# Patient Record
Sex: FEMALE | Race: WHITE | NOT HISPANIC OR LATINO | URBAN - METROPOLITAN AREA
[De-identification: names, ages, dates, MRNs, and addresses within clinical notes are randomized per-mention and may not be internally consistent; named-entity substitution may affect disease eponyms.]

---

## 2020-07-25 ENCOUNTER — TELEPHONE ENCOUNTER (OUTPATIENT)
Dept: URBAN - METROPOLITAN AREA CLINIC 13 | Facility: CLINIC | Age: 50
End: 2020-07-25

## 2020-07-26 ENCOUNTER — TELEPHONE ENCOUNTER (OUTPATIENT)
Dept: URBAN - METROPOLITAN AREA CLINIC 13 | Facility: CLINIC | Age: 50
End: 2020-07-26

## 2021-05-19 ENCOUNTER — OFFICE VISIT (OUTPATIENT)
Dept: URBAN - METROPOLITAN AREA CLINIC 107 | Facility: CLINIC | Age: 51
End: 2021-05-19
Payer: MEDICARE

## 2021-05-19 ENCOUNTER — LAB OUTSIDE AN ENCOUNTER (OUTPATIENT)
Dept: URBAN - METROPOLITAN AREA CLINIC 107 | Facility: CLINIC | Age: 51
End: 2021-05-19

## 2021-05-19 ENCOUNTER — OFFICE VISIT (OUTPATIENT)
Dept: URBAN - METROPOLITAN AREA CLINIC 107 | Facility: CLINIC | Age: 51
End: 2021-05-19

## 2021-05-19 ENCOUNTER — WEB ENCOUNTER (OUTPATIENT)
Dept: URBAN - METROPOLITAN AREA CLINIC 107 | Facility: CLINIC | Age: 51
End: 2021-05-19

## 2021-05-19 VITALS
WEIGHT: 167 LBS | HEIGHT: 62 IN | RESPIRATION RATE: 18 BRPM | SYSTOLIC BLOOD PRESSURE: 163 MMHG | TEMPERATURE: 98.6 F | BODY MASS INDEX: 30.73 KG/M2 | HEART RATE: 66 BPM | DIASTOLIC BLOOD PRESSURE: 81 MMHG

## 2021-05-19 DIAGNOSIS — R74.8 ELEVATED LIVER ENZYMES: ICD-10-CM

## 2021-05-19 DIAGNOSIS — K21.9 GERD: ICD-10-CM

## 2021-05-19 DIAGNOSIS — R13.14 PHARYNGOESOPHAGEAL DYSPHAGIA: ICD-10-CM

## 2021-05-19 DIAGNOSIS — R11.2 NAUSEA WITH VOMITING, UNSPECIFIED: ICD-10-CM

## 2021-05-19 DIAGNOSIS — R10.13 EPIGASTRIC ABDOMINAL PAIN: ICD-10-CM

## 2021-05-19 PROCEDURE — 99204 OFFICE O/P NEW MOD 45 MIN: CPT | Performed by: INTERNAL MEDICINE

## 2021-05-19 RX ORDER — FAMOTIDINE 20 MG/1
1 TABLET AT BEDTIME AS NEEDED TABLET, FILM COATED ORAL ONCE A DAY
Status: ACTIVE | COMMUNITY

## 2021-05-19 RX ORDER — DOXYCYCLINE HYCLATE 100 MG/1
1 CAPSULE CAPSULE ORAL ONCE A DAY
Status: ACTIVE | COMMUNITY

## 2021-05-19 RX ORDER — HYDROCHLOROTHIAZIDE 12.5 MG/1
1 TABLET IN THE MORNING TABLET ORAL ONCE A DAY
Status: ACTIVE | COMMUNITY

## 2021-05-19 RX ORDER — CITALOPRAM HYDROBROMIDE 40 MG/1
0.5 TABLET TABLET, FILM COATED ORAL ONCE A DAY
Status: ACTIVE | COMMUNITY

## 2021-05-19 RX ORDER — MULTIVIT-MIN/FA/LYCOPEN/LUTEIN .4-300-25
AS DIRECTED TABLET ORAL
Status: ACTIVE | COMMUNITY

## 2021-05-19 RX ORDER — GLUCAGON INJECTION, SOLUTION 0.5 MG/.1ML
AS DIRECTED INJECTION, SOLUTION SUBCUTANEOUS
Status: ACTIVE | COMMUNITY

## 2021-05-19 RX ORDER — NEBIVOLOL HYDROCHLORIDE 5 MG/1
1 TABLET TABLET ORAL ONCE A DAY
Status: ACTIVE | COMMUNITY

## 2021-05-19 RX ORDER — IRBESARTAN AND HYDROCHLOROTHIAZIDE 300; 12.5 MG/1; MG/1
1 TABLET TABLET ORAL ONCE A DAY
Status: ACTIVE | COMMUNITY

## 2021-05-19 RX ORDER — OMEPRAZOLE 40 MG/1
1 CAPSULE TWICE DAILY, PRIOR TO BREAKFAST AND DINNER CAPSULE, DELAYED RELEASE ORAL TWICE DAILY
Qty: 180 | Refills: 3 | OUTPATIENT
Start: 2021-05-19

## 2021-05-19 RX ORDER — PROMETHAZINE HYDROCHLORIDE 12.5 MG/1
1 TABLET AS NEEDED FOR NAUSEA TABLET ORAL
Qty: 40 TABLET | Refills: 1 | OUTPATIENT
Start: 2021-05-19 | End: 2021-06-08

## 2021-05-19 RX ORDER — ONDANSETRON HYDROCHLORIDE 4 MG/1
1 TABLET TABLET, FILM COATED ORAL ONCE A DAY
Status: ACTIVE | COMMUNITY

## 2021-05-19 RX ORDER — LINACLOTIDE 290 UG/1
1 CAPSULE AT LEAST 30 MINUTES BEFORE THE FIRST MEAL OF THE DAY ON AN EMPTY STOMACH CAPSULE, GELATIN COATED ORAL ONCE A DAY
Status: ACTIVE | COMMUNITY

## 2021-05-19 RX ORDER — ROSUVASTATIN CALCIUM 5 MG/1
1 TABLET TABLET, FILM COATED ORAL ONCE A DAY
Status: ACTIVE | COMMUNITY

## 2021-05-19 RX ORDER — FENOFIBRATE 200 MG/1
1 CAPSULE WITH A MEAL CAPSULE ORAL ONCE A DAY
Status: ACTIVE | COMMUNITY

## 2021-05-19 RX ORDER — OMEPRAZOLE 40 MG/1
1 CAPSULE 30 MINUTES BEFORE MORNING MEAL CAPSULE, DELAYED RELEASE ORAL ONCE A DAY
Status: ACTIVE | COMMUNITY

## 2021-05-19 RX ORDER — PANCRELIPASE 36000; 180000; 114000 [USP'U]/1; [USP'U]/1; [USP'U]/1
AS DIRECTED CAPSULE, DELAYED RELEASE PELLETS ORAL
Status: ACTIVE | COMMUNITY

## 2021-05-19 RX ORDER — DICYCLOMINE HYDROCHLORIDE 20 MG/1
1 TABLET TABLET ORAL THREE TIMES A DAY
Status: ACTIVE | COMMUNITY

## 2021-05-19 NOTE — HPI-TODAY'S VISIT:
50-year-old female with an extensive medical history to include febrile pancreatitis with SIRS and questionable pancreas divisum s/p total pancreatectomy, splenectomy, and cholecystectomy (2009, Dr. Niko Martin at [facility]), and multiple abdominal hernia repairs, referred by Jonathan Kiff, PA-C for evaluation of abdominal pain.  A copy of this document is being forwarded to the referring provider. This is a second opinion. She has a complicated medical history, and unfortunately is a fairly poor historian. Per a consult note from Dr. Bess in 2009, she was hospitalized at [facility] for abdominal pain related to acute, recurrent pancreatitis suspected to be related to familial hyperlipidemia. Her hospital course was complicated by respiratory distress syndrome requiring mechanical ventilation; prior ERCP findings raised concern for possible pancreas divisum. Due to the need for possible surgical intervention, she was transferred to [facility].  Per the patient, she was hospitalized at [facility] for 7 months, undergoing numerous surgeries. Initially, the head of the pancreas was removed, but symptoms persisted. This led to a splenectomy, and ultimately a total pancreatectomy. 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 Her initial surgeries were performed by Dr. Martin at [facility]; she has since followed with Dr. Buckner and undergone multiple hernia repairs. She was most recently seen at [facility] in February of this year, but when she complained of abdominal pain she was only offered narcotic medications. The patient declined, and has elected to no longer follow with the surgery team at [facility]. She actually mentions that she has an  involved due to prior post-operative care and subsequent surgical complications which were overlooked/dismissed.  Within the last three weeks, she has experienced intermittent exacerbations of sharp, aching mid to upper abdominal pain with nausea and vomiting up to 4x daily. The symptoms are worsened after meals, and respond to Zofran or Phenergan when needed. She denies reflux symptoms on omeprazole and famotidine. She reprts multiple CT scans and abdominal xrays within the last month at [facility], which reportedly showed constipation. She was started on Linzess, without improvement in her symptoms. She has three bowel movements per day at baseline, with an increase to 6 stools daily on Linzess. She denies blood per rectum. Her weight fluctuates, but she has not experienced any unintentional weight loss. She was recently evaluated by surgery, Dr. Castro, in [place]. Her abdominal pain was attributed to a hernia, but he did not recommend surgical intervention. She had an iron infusion this morning, arranged by her PCP. She avoids NSAIDs. Her daughter was present at the close of the interview to discuss the plan of care.

## 2021-05-19 NOTE — HPI-OTHER HISTORIES
Labs 4/14/2021:H/H 10.1/33, MCV 85.3, , WBC 10.5.  Ferritin 26.1.  Normal folate and vitamin B12.  AST 67, ALT 29, , T bili 0.7, CMP otherwise unremarkable aside from glucose 124.

## 2021-06-01 ENCOUNTER — OFFICE VISIT (OUTPATIENT)
Dept: URBAN - METROPOLITAN AREA SURGERY CENTER 25 | Facility: SURGERY CENTER | Age: 51
End: 2021-06-01
Payer: MEDICARE

## 2021-06-01 ENCOUNTER — LAB OUTSIDE AN ENCOUNTER (OUTPATIENT)
Dept: URBAN - METROPOLITAN AREA CLINIC 113 | Facility: CLINIC | Age: 51
End: 2021-06-01

## 2021-06-01 ENCOUNTER — CLAIMS CREATED FROM THE CLAIM WINDOW (OUTPATIENT)
Dept: URBAN - METROPOLITAN AREA CLINIC 4 | Facility: CLINIC | Age: 51
End: 2021-06-01
Payer: MEDICARE

## 2021-06-01 ENCOUNTER — TELEPHONE ENCOUNTER (OUTPATIENT)
Dept: URBAN - METROPOLITAN AREA CLINIC 113 | Facility: CLINIC | Age: 51
End: 2021-06-01

## 2021-06-01 ENCOUNTER — OFFICE VISIT (OUTPATIENT)
Dept: URBAN - METROPOLITAN AREA SURGERY CENTER 25 | Facility: SURGERY CENTER | Age: 51
End: 2021-06-01

## 2021-06-01 DIAGNOSIS — K21.9 ACID REFLUX: ICD-10-CM

## 2021-06-01 DIAGNOSIS — K31.89 ACQUIRED DEFORMITY OF DUODENUM: ICD-10-CM

## 2021-06-01 DIAGNOSIS — K28.4 CHRONIC OR UNSPECIFIED GASTROJEJUNAL ULCER WITH HEMORRHAGE: ICD-10-CM

## 2021-06-01 DIAGNOSIS — K31.89 SMALL STOMACH SYNDROME: ICD-10-CM

## 2021-06-01 PROCEDURE — 43239 EGD BIOPSY SINGLE/MULTIPLE: CPT | Performed by: INTERNAL MEDICINE

## 2021-06-01 PROCEDURE — 88305 TISSUE EXAM BY PATHOLOGIST: CPT | Performed by: PATHOLOGY

## 2021-06-01 PROCEDURE — 88342 IMHCHEM/IMCYTCHM 1ST ANTB: CPT | Performed by: PATHOLOGY

## 2021-06-01 PROCEDURE — G8907 PT DOC NO EVENTS ON DISCHARG: HCPCS | Performed by: INTERNAL MEDICINE

## 2021-06-01 RX ORDER — FENOFIBRATE 200 MG/1
1 CAPSULE WITH A MEAL CAPSULE ORAL ONCE A DAY
Status: ACTIVE | COMMUNITY

## 2021-06-01 RX ORDER — CITALOPRAM HYDROBROMIDE 40 MG/1
0.5 TABLET TABLET, FILM COATED ORAL ONCE A DAY
Status: ACTIVE | COMMUNITY

## 2021-06-01 RX ORDER — DOXYCYCLINE HYCLATE 100 MG/1
1 CAPSULE CAPSULE ORAL ONCE A DAY
Status: ACTIVE | COMMUNITY

## 2021-06-01 RX ORDER — OMEPRAZOLE 40 MG/1
1 CAPSULE TWICE DAILY, PRIOR TO BREAKFAST AND DINNER CAPSULE, DELAYED RELEASE ORAL TWICE DAILY
Qty: 180 | Refills: 3 | Status: ACTIVE | COMMUNITY
Start: 2021-05-19

## 2021-06-01 RX ORDER — GLUCAGON INJECTION, SOLUTION 0.5 MG/.1ML
AS DIRECTED INJECTION, SOLUTION SUBCUTANEOUS
Status: ACTIVE | COMMUNITY

## 2021-06-01 RX ORDER — IRBESARTAN AND HYDROCHLOROTHIAZIDE 300; 12.5 MG/1; MG/1
1 TABLET TABLET ORAL ONCE A DAY
Status: ACTIVE | COMMUNITY

## 2021-06-01 RX ORDER — DICYCLOMINE HYDROCHLORIDE 20 MG/1
1 TABLET TABLET ORAL THREE TIMES A DAY
Status: ACTIVE | COMMUNITY

## 2021-06-01 RX ORDER — LINACLOTIDE 145 UG/1
1 CAPSULE AT LEAST 30 MINUTES BEFORE THE FIRST MEAL OF THE DAY ON AN EMPTY STOMACH CAPSULE, GELATIN COATED ORAL ONCE A DAY
Qty: 30 | Refills: 11 | OUTPATIENT
Start: 2021-06-01 | End: 2022-05-27

## 2021-06-01 RX ORDER — ONDANSETRON HYDROCHLORIDE 4 MG/1
1 TABLET TABLET, FILM COATED ORAL ONCE A DAY
Status: ACTIVE | COMMUNITY

## 2021-06-01 RX ORDER — HYDROCHLOROTHIAZIDE 12.5 MG/1
1 TABLET IN THE MORNING TABLET ORAL ONCE A DAY
Status: ACTIVE | COMMUNITY

## 2021-06-01 RX ORDER — OMEPRAZOLE 40 MG/1
1 CAPSULE 30 MINUTES BEFORE MORNING MEAL CAPSULE, DELAYED RELEASE ORAL ONCE A DAY
Status: ACTIVE | COMMUNITY

## 2021-06-01 RX ORDER — ROSUVASTATIN CALCIUM 5 MG/1
1 TABLET TABLET, FILM COATED ORAL ONCE A DAY
Status: ACTIVE | COMMUNITY

## 2021-06-01 RX ORDER — LINACLOTIDE 290 UG/1
1 CAPSULE AT LEAST 30 MINUTES BEFORE THE FIRST MEAL OF THE DAY ON AN EMPTY STOMACH CAPSULE, GELATIN COATED ORAL ONCE A DAY
Status: ACTIVE | COMMUNITY

## 2021-06-01 RX ORDER — PROMETHAZINE HYDROCHLORIDE 12.5 MG/1
1 TABLET AS NEEDED FOR NAUSEA TABLET ORAL
Qty: 40 TABLET | Refills: 1 | Status: ACTIVE | COMMUNITY
Start: 2021-05-19 | End: 2021-06-08

## 2021-06-01 RX ORDER — PANCRELIPASE 36000; 180000; 114000 [USP'U]/1; [USP'U]/1; [USP'U]/1
AS DIRECTED CAPSULE, DELAYED RELEASE PELLETS ORAL
Status: ACTIVE | COMMUNITY

## 2021-06-01 RX ORDER — FAMOTIDINE 20 MG/1
1 TABLET AT BEDTIME AS NEEDED TABLET, FILM COATED ORAL ONCE A DAY
Status: ACTIVE | COMMUNITY

## 2021-06-01 RX ORDER — NEBIVOLOL HYDROCHLORIDE 5 MG/1
1 TABLET TABLET ORAL ONCE A DAY
Status: ACTIVE | COMMUNITY

## 2021-06-01 RX ORDER — MULTIVIT-MIN/FA/LYCOPEN/LUTEIN .4-300-25
AS DIRECTED TABLET ORAL
Status: ACTIVE | COMMUNITY

## 2021-06-02 ENCOUNTER — OFFICE VISIT (OUTPATIENT)
Dept: URBAN - METROPOLITAN AREA CLINIC 107 | Facility: CLINIC | Age: 51
End: 2021-06-02

## 2021-07-13 ENCOUNTER — OFFICE VISIT (OUTPATIENT)
Dept: URBAN - METROPOLITAN AREA CLINIC 113 | Facility: CLINIC | Age: 51
End: 2021-07-13

## 2021-07-13 RX ORDER — OMEPRAZOLE 40 MG/1
1 CAPSULE TWICE DAILY, PRIOR TO BREAKFAST AND DINNER CAPSULE, DELAYED RELEASE ORAL TWICE DAILY
Qty: 180 | Refills: 3 | Status: ACTIVE | COMMUNITY
Start: 2021-05-19

## 2021-07-13 RX ORDER — DICYCLOMINE HYDROCHLORIDE 20 MG/1
1 TABLET TABLET ORAL THREE TIMES A DAY
Status: ACTIVE | COMMUNITY

## 2021-07-13 RX ORDER — DOXYCYCLINE HYCLATE 100 MG/1
1 CAPSULE CAPSULE ORAL ONCE A DAY
Status: ACTIVE | COMMUNITY

## 2021-07-13 RX ORDER — ONDANSETRON HYDROCHLORIDE 4 MG/1
1 TABLET TABLET, FILM COATED ORAL ONCE A DAY
Status: ACTIVE | COMMUNITY

## 2021-07-13 RX ORDER — HYDROCHLOROTHIAZIDE 12.5 MG/1
1 TABLET IN THE MORNING TABLET ORAL ONCE A DAY
Status: ACTIVE | COMMUNITY

## 2021-07-13 RX ORDER — LINACLOTIDE 145 UG/1
1 CAPSULE AT LEAST 30 MINUTES BEFORE THE FIRST MEAL OF THE DAY ON AN EMPTY STOMACH CAPSULE, GELATIN COATED ORAL ONCE A DAY
Qty: 30 | Refills: 11 | Status: ACTIVE | COMMUNITY
Start: 2021-06-01 | End: 2022-05-27

## 2021-07-13 RX ORDER — PANCRELIPASE 36000; 180000; 114000 [USP'U]/1; [USP'U]/1; [USP'U]/1
AS DIRECTED CAPSULE, DELAYED RELEASE PELLETS ORAL
Status: ACTIVE | COMMUNITY

## 2021-07-13 RX ORDER — OMEPRAZOLE 40 MG/1
1 CAPSULE 30 MINUTES BEFORE MORNING MEAL CAPSULE, DELAYED RELEASE ORAL ONCE A DAY
Status: ACTIVE | COMMUNITY

## 2021-07-13 RX ORDER — FAMOTIDINE 20 MG/1
1 TABLET AT BEDTIME AS NEEDED TABLET, FILM COATED ORAL ONCE A DAY
Status: ACTIVE | COMMUNITY

## 2021-07-13 RX ORDER — LINACLOTIDE 290 UG/1
1 CAPSULE AT LEAST 30 MINUTES BEFORE THE FIRST MEAL OF THE DAY ON AN EMPTY STOMACH CAPSULE, GELATIN COATED ORAL ONCE A DAY
Status: ACTIVE | COMMUNITY

## 2021-07-13 RX ORDER — NEBIVOLOL HYDROCHLORIDE 5 MG/1
1 TABLET TABLET ORAL ONCE A DAY
Status: ACTIVE | COMMUNITY

## 2021-07-13 RX ORDER — GLUCAGON INJECTION, SOLUTION 0.5 MG/.1ML
AS DIRECTED INJECTION, SOLUTION SUBCUTANEOUS
Status: ACTIVE | COMMUNITY

## 2021-07-13 RX ORDER — FENOFIBRATE 200 MG/1
1 CAPSULE WITH A MEAL CAPSULE ORAL ONCE A DAY
Status: ACTIVE | COMMUNITY

## 2021-07-13 RX ORDER — CITALOPRAM HYDROBROMIDE 40 MG/1
0.5 TABLET TABLET, FILM COATED ORAL ONCE A DAY
Status: ACTIVE | COMMUNITY

## 2021-07-13 RX ORDER — IRBESARTAN AND HYDROCHLOROTHIAZIDE 300; 12.5 MG/1; MG/1
1 TABLET TABLET ORAL ONCE A DAY
Status: ACTIVE | COMMUNITY

## 2021-07-13 RX ORDER — ROSUVASTATIN CALCIUM 5 MG/1
1 TABLET TABLET, FILM COATED ORAL ONCE A DAY
Status: ACTIVE | COMMUNITY

## 2021-07-13 RX ORDER — MULTIVIT-MIN/FA/LYCOPEN/LUTEIN .4-300-25
AS DIRECTED TABLET ORAL
Status: ACTIVE | COMMUNITY

## 2021-08-16 ENCOUNTER — WEB ENCOUNTER (OUTPATIENT)
Dept: URBAN - METROPOLITAN AREA CLINIC 113 | Facility: CLINIC | Age: 51
End: 2021-08-16

## 2021-08-16 ENCOUNTER — OFFICE VISIT (OUTPATIENT)
Dept: URBAN - METROPOLITAN AREA CLINIC 113 | Facility: CLINIC | Age: 51
End: 2021-08-16
Payer: MEDICARE

## 2021-08-16 VITALS
HEART RATE: 51 BPM | SYSTOLIC BLOOD PRESSURE: 154 MMHG | DIASTOLIC BLOOD PRESSURE: 74 MMHG | BODY MASS INDEX: 30 KG/M2 | HEIGHT: 62 IN | WEIGHT: 163 LBS | TEMPERATURE: 97.8 F

## 2021-08-16 DIAGNOSIS — K59.09 CHANGE IN BOWEL MOVEMENTS INTERMITTENT CONSTIPATION. URGENCY IN THE MORNING.: ICD-10-CM

## 2021-08-16 DIAGNOSIS — R13.14 PHARYNGOESOPHAGEAL DYSPHAGIA: ICD-10-CM

## 2021-08-16 DIAGNOSIS — R11.2 NAUSEA WITH VOMITING, UNSPECIFIED: ICD-10-CM

## 2021-08-16 DIAGNOSIS — R74.8 ELEVATED LIVER ENZYMES: ICD-10-CM

## 2021-08-16 DIAGNOSIS — K21.9 GERD: ICD-10-CM

## 2021-08-16 PROCEDURE — 99214 OFFICE O/P EST MOD 30 MIN: CPT | Performed by: NURSE PRACTITIONER

## 2021-08-16 RX ORDER — HUMAN INSULIN 100 [IU]/ML
AS DIRECTED INJECTION, SUSPENSION SUBCUTANEOUS
Status: ACTIVE | COMMUNITY

## 2021-08-16 RX ORDER — NEBIVOLOL HYDROCHLORIDE 5 MG/1
1 TABLET TABLET ORAL ONCE A DAY
Status: ACTIVE | COMMUNITY

## 2021-08-16 RX ORDER — ONDANSETRON HYDROCHLORIDE 4 MG/1
1 TABLET TABLET, FILM COATED ORAL ONCE A DAY
Status: ACTIVE | COMMUNITY

## 2021-08-16 RX ORDER — GLUCAGON INJECTION, SOLUTION 0.5 MG/.1ML
AS DIRECTED INJECTION, SOLUTION SUBCUTANEOUS
Status: ACTIVE | COMMUNITY

## 2021-08-16 RX ORDER — CITALOPRAM HYDROBROMIDE 40 MG/1
0.5 TABLET TABLET, FILM COATED ORAL ONCE A DAY
Status: ACTIVE | COMMUNITY

## 2021-08-16 RX ORDER — PANCRELIPASE 36000; 180000; 114000 [USP'U]/1; [USP'U]/1; [USP'U]/1
AS DIRECTED CAPSULE, DELAYED RELEASE PELLETS ORAL
Status: ACTIVE | COMMUNITY

## 2021-08-16 RX ORDER — IRBESARTAN AND HYDROCHLOROTHIAZIDE 300; 12.5 MG/1; MG/1
1 TABLET TABLET ORAL ONCE A DAY
Status: ACTIVE | COMMUNITY

## 2021-08-16 RX ORDER — MULTIVIT-MIN/FA/LYCOPEN/LUTEIN .4-300-25
AS DIRECTED TABLET ORAL
Status: ACTIVE | COMMUNITY

## 2021-08-16 RX ORDER — OMEPRAZOLE 40 MG/1
1 CAPSULE TWICE DAILY, PRIOR TO BREAKFAST AND DINNER CAPSULE, DELAYED RELEASE ORAL TWICE DAILY
Qty: 180 | Refills: 3 | Status: ON HOLD | COMMUNITY
Start: 2021-05-19

## 2021-08-16 RX ORDER — LINACLOTIDE 145 UG/1
1 CAPSULE AT LEAST 30 MINUTES BEFORE THE FIRST MEAL OF THE DAY ON AN EMPTY STOMACH CAPSULE, GELATIN COATED ORAL ONCE A DAY
Qty: 30 | Refills: 11 | Status: ON HOLD | COMMUNITY
Start: 2021-06-01 | End: 2022-05-27

## 2021-08-16 RX ORDER — FAMOTIDINE 20 MG/1
1 TABLET AT BEDTIME AS NEEDED TABLET, FILM COATED ORAL ONCE A DAY
OUTPATIENT

## 2021-08-16 RX ORDER — DICYCLOMINE HYDROCHLORIDE 20 MG/1
1 TABLET TABLET ORAL THREE TIMES A DAY
Status: ACTIVE | COMMUNITY

## 2021-08-16 RX ORDER — DOXYCYCLINE HYCLATE 100 MG/1
1 CAPSULE CAPSULE ORAL ONCE A DAY
Status: ACTIVE | COMMUNITY

## 2021-08-16 RX ORDER — LINACLOTIDE 290 UG/1
1 CAPSULE AT LEAST 30 MINUTES BEFORE THE FIRST MEAL OF THE DAY ON AN EMPTY STOMACH CAPSULE, GELATIN COATED ORAL ONCE A DAY
Status: ACTIVE | COMMUNITY

## 2021-08-16 RX ORDER — ROSUVASTATIN CALCIUM 5 MG/1
1 TABLET TABLET, FILM COATED ORAL ONCE A DAY
Status: ACTIVE | COMMUNITY

## 2021-08-16 RX ORDER — FENOFIBRATE 200 MG/1
1 CAPSULE WITH A MEAL CAPSULE ORAL ONCE A DAY
Status: ACTIVE | COMMUNITY

## 2021-08-16 RX ORDER — FAMOTIDINE 20 MG/1
1 TABLET AT BEDTIME AS NEEDED TABLET, FILM COATED ORAL ONCE A DAY
Status: ACTIVE | COMMUNITY

## 2021-08-16 RX ORDER — OMEPRAZOLE 40 MG/1
1 CAPSULE 30 MINUTES BEFORE MORNING MEAL CAPSULE, DELAYED RELEASE ORAL ONCE A DAY
Status: ACTIVE | COMMUNITY

## 2021-08-16 RX ORDER — HYDROCHLOROTHIAZIDE 12.5 MG/1
1 TABLET IN THE MORNING TABLET ORAL ONCE A DAY
Status: ACTIVE | COMMUNITY

## 2021-08-16 RX ORDER — OMEPRAZOLE 40 MG/1
1 CAPSULE 30 MINUTES BEFORE MORNING MEAL CAPSULE, DELAYED RELEASE ORAL ONCE A DAY
OUTPATIENT

## 2021-08-16 NOTE — HPI-TODAY'S VISIT:
50-year-old female with an extensive medical history to include febrile pancreatitis with SIRS and questionable pancreas divisum s/p total pancreatectomy, splenectomy, and cholecystectomy (2009, Dr. Niko Martin [place] [facility] and multiple abdominal hernia repairs, presenting for follow-up after EGD.  She was last seen 5/19/2021 for evaluation of mid to upper abdominal pain and nausea/vomiting in the setting of chronic GERD with progressive solid dysphagia.  Recent CT reportedly shows stool burden, but was otherwise unremarkable.  Her omeprazole was increased to twice daily and promethazine was provided to use as needed.  An EGD was planned.  Regarding mild transaminitis, her LFTs were repeated to trend.  The EGD was performed on 6/1/2021.  Findings included a widely patent gastrojejunostomy, a solitary jejunal erosion with adherent blood just distal of the anastomosis, mild nonerosive corpus gastritis, and normal jejunal limb status post unremarkable random biopsies.  Gastric biopsies were negative for H. pylori.  A gastric emptying study and barium swallow were recommended at the time of the procedure.  Within the last two weeks, she has experienced persistent burning discomfort just above her belly button. She reports associated nausea, typically with eating. This responds to promethazine twice daily. She has not vomited. She has a nonbloody stool once daily with Linzess 290mcg daily. She admits to small volume stools with incomplete evacuation, often feeling the urge to have a bowel movement later in the day without response.

## 2021-08-16 NOTE — HPI-OTHER HISTORIES
Labs 4/14/2021:H/H 10.1/33, MCV 85.3, , WBC 10.5.  Ferritin 26.1.  Normal folate and vitamin B12.  AST 67, ALT 29, , T bili 0.7, CMP otherwise unremarkable aside from glucose 124. She has a complicated medical history, and unfortunately is a fairly poor historian. Per a consult note from Dr. Bess in 2009, she was hospitalized at [facility] for abdominal pain related to acute, recurrent pancreatitis suspected to be related to familial hyperlipidemia. Her hospital course was complicated by respiratory distress syndrome requiring mechanical ventilation; prior ERCP findings raised concern for possible pancreas divisum. Due to the need for possible surgical intervention, she was transferred [place] [facility]  Per the patient, she was hospitalized at [facility] for 7 months, undergoing numerous surgeries. Initially, the head of the pancreas was removed, but symptoms persisted. This led to a splenectomy, and ultimately a total pancreatectomy. 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 Her initial surgeries were performed by Dr. Martin at [facility]; she has since followed with Dr. Buckner and undergone multiple hernia repairs.

## 2021-08-17 ENCOUNTER — TELEPHONE ENCOUNTER (OUTPATIENT)
Dept: URBAN - METROPOLITAN AREA CLINIC 113 | Facility: CLINIC | Age: 51
End: 2021-08-17

## 2021-08-17 LAB
ALBUMIN: 4.4
ALKALINE PHOSPHATASE: 194
ALT (SGPT): 47
AST (SGOT): 74
BILIRUBIN, DIRECT: 0.36
BILIRUBIN, TOTAL: 0.8
PROTEIN, TOTAL: 7.9

## 2021-09-28 ENCOUNTER — OFFICE VISIT (OUTPATIENT)
Dept: URBAN - METROPOLITAN AREA CLINIC 113 | Facility: CLINIC | Age: 51
End: 2021-09-28
Payer: MEDICARE

## 2021-09-28 ENCOUNTER — WEB ENCOUNTER (OUTPATIENT)
Dept: URBAN - METROPOLITAN AREA CLINIC 113 | Facility: CLINIC | Age: 51
End: 2021-09-28

## 2021-09-28 VITALS
HEIGHT: 62 IN | SYSTOLIC BLOOD PRESSURE: 148 MMHG | WEIGHT: 163 LBS | BODY MASS INDEX: 30 KG/M2 | TEMPERATURE: 98.1 F | DIASTOLIC BLOOD PRESSURE: 74 MMHG | HEART RATE: 55 BPM | RESPIRATION RATE: 18 BRPM

## 2021-09-28 DIAGNOSIS — R74.8 ELEVATED LIVER ENZYMES: ICD-10-CM

## 2021-09-28 DIAGNOSIS — R10.13 EPIGASTRIC ABDOMINAL PAIN: ICD-10-CM

## 2021-09-28 DIAGNOSIS — R13.14 PHARYNGOESOPHAGEAL DYSPHAGIA: ICD-10-CM

## 2021-09-28 DIAGNOSIS — K59.01 CONSTIPATION: ICD-10-CM

## 2021-09-28 DIAGNOSIS — K21.9 GERD: ICD-10-CM

## 2021-09-28 PROCEDURE — 99214 OFFICE O/P EST MOD 30 MIN: CPT | Performed by: INTERNAL MEDICINE

## 2021-09-28 RX ORDER — PANTOPRAZOLE SODIUM 40 MG/1
1 TABLET TABLET, DELAYED RELEASE ORAL ONCE A DAY
Qty: 30 | Refills: 11 | OUTPATIENT
Start: 2021-09-28

## 2021-09-28 RX ORDER — DOXYCYCLINE HYCLATE 100 MG/1
1 CAPSULE CAPSULE ORAL ONCE A DAY
Status: DISCONTINUED | COMMUNITY

## 2021-09-28 RX ORDER — HUMAN INSULIN 100 [IU]/ML
AS DIRECTED INJECTION, SUSPENSION SUBCUTANEOUS
Status: ACTIVE | COMMUNITY

## 2021-09-28 RX ORDER — LINACLOTIDE 290 UG/1
1 CAPSULE AT LEAST 30 MINUTES BEFORE THE FIRST MEAL OF THE DAY ON AN EMPTY STOMACH CAPSULE, GELATIN COATED ORAL ONCE A DAY
Qty: 30 | Refills: 6

## 2021-09-28 RX ORDER — FAMOTIDINE 20 MG/1
1 TABLET AT BEDTIME AS NEEDED TABLET, FILM COATED ORAL ONCE A DAY
Status: ACTIVE | COMMUNITY

## 2021-09-28 RX ORDER — LINACLOTIDE 145 UG/1
1 CAPSULE AT LEAST 30 MINUTES BEFORE THE FIRST MEAL OF THE DAY ON AN EMPTY STOMACH CAPSULE, GELATIN COATED ORAL ONCE A DAY
Status: ACTIVE | COMMUNITY

## 2021-09-28 RX ORDER — MULTIVIT-MIN/FA/LYCOPEN/LUTEIN .4-300-25
AS DIRECTED TABLET ORAL
Status: ACTIVE | COMMUNITY

## 2021-09-28 RX ORDER — ONDANSETRON HYDROCHLORIDE 4 MG/1
1 TABLET TABLET, FILM COATED ORAL ONCE A DAY
Status: ACTIVE | COMMUNITY

## 2021-09-28 RX ORDER — ROSUVASTATIN CALCIUM 5 MG/1
1 TABLET TABLET, FILM COATED ORAL ONCE A DAY
Status: ACTIVE | COMMUNITY

## 2021-09-28 RX ORDER — TRAZODONE HYDROCHLORIDE 50 MG/1
1 TABLET AT BEDTIME AS NEEDED TABLET, FILM COATED ORAL ONCE A DAY
Status: ACTIVE | COMMUNITY

## 2021-09-28 RX ORDER — GLUCAGON INJECTION, SOLUTION 0.5 MG/.1ML
AS DIRECTED INJECTION, SOLUTION SUBCUTANEOUS
Status: ACTIVE | COMMUNITY

## 2021-09-28 RX ORDER — IRBESARTAN AND HYDROCHLOROTHIAZIDE 300; 12.5 MG/1; MG/1
1 TABLET TABLET ORAL ONCE A DAY
Status: DISCONTINUED | COMMUNITY

## 2021-09-28 RX ORDER — DICYCLOMINE HYDROCHLORIDE 20 MG/1
1 TABLET TABLET ORAL THREE TIMES A DAY
Status: ACTIVE | COMMUNITY

## 2021-09-28 RX ORDER — LINACLOTIDE 145 UG/1
1 CAPSULE AT LEAST 30 MINUTES BEFORE THE FIRST MEAL OF THE DAY ON AN EMPTY STOMACH CAPSULE, GELATIN COATED ORAL ONCE A DAY
Qty: 30 | Refills: 11 | Status: DISCONTINUED | COMMUNITY
Start: 2021-06-01 | End: 2022-05-27

## 2021-09-28 RX ORDER — FENOFIBRATE 200 MG/1
1 CAPSULE WITH A MEAL CAPSULE ORAL ONCE A DAY
Status: ACTIVE | COMMUNITY

## 2021-09-28 RX ORDER — OMEPRAZOLE 40 MG/1
1 CAPSULE 30 MINUTES BEFORE MORNING MEAL CAPSULE, DELAYED RELEASE ORAL ONCE A DAY
Status: ACTIVE | COMMUNITY

## 2021-09-28 RX ORDER — PANCRELIPASE 36000; 180000; 114000 [USP'U]/1; [USP'U]/1; [USP'U]/1
AS DIRECTED CAPSULE, DELAYED RELEASE PELLETS ORAL
Status: ACTIVE | COMMUNITY

## 2021-09-28 RX ORDER — MIRABEGRON 25 MG/1
1 TABLET TABLET, FILM COATED, EXTENDED RELEASE ORAL ONCE A DAY
Status: ACTIVE | COMMUNITY

## 2021-09-28 RX ORDER — NEBIVOLOL HYDROCHLORIDE 5 MG/1
1 TABLET TABLET ORAL ONCE A DAY
Status: DISCONTINUED | COMMUNITY

## 2021-09-28 RX ORDER — OMEPRAZOLE 40 MG/1
1 CAPSULE TWICE DAILY, PRIOR TO BREAKFAST AND DINNER CAPSULE, DELAYED RELEASE ORAL TWICE DAILY
Qty: 180 | Refills: 3 | Status: DISCONTINUED | COMMUNITY
Start: 2021-05-19

## 2021-09-28 RX ORDER — HYDROCHLOROTHIAZIDE 12.5 MG/1
1 TABLET IN THE MORNING TABLET ORAL ONCE A DAY
Status: ACTIVE | COMMUNITY

## 2021-09-28 RX ORDER — INSULIN GLARGINE 100 [IU]/ML
AS DIRECTED INJECTION, SOLUTION SUBCUTANEOUS
Status: ACTIVE | COMMUNITY

## 2021-09-28 RX ORDER — CITALOPRAM HYDROBROMIDE 40 MG/1
0.5 TABLET TABLET, FILM COATED ORAL ONCE A DAY
Status: ACTIVE | COMMUNITY

## 2021-09-28 NOTE — HPI-TODAY'S VISIT:
50-year-old female with an extensive medical history to include febrile pancreatitis with SIRS and questionable pancreas divisum s/p total pancreatectomy, splenectomy, and cholecystectomy ([place], [place] Nik[place] [facility] and multiple abdominal hernia repairs, presenting for follow-up after EGD. . She has a nonbloody stool once daily with Linzess 290mcg daily. She admits to small volume stools with incomplete evacuation, often feeling the urge to have a bowel movement later in the day without response. She has some pain in her lower back. Intermittent difficulty swallowing pills. No complaints of reflux at this time. .  Abdominal pelvic September 2021.  Prior splenectomy.  Splenosis was noted.  Gastric bypass status.  Nonobstructing kidney stone.  No acute abnormalities identified . Labs August 2021.  AST 74, ALT 47, alkaline phosphatase 194 . EGD 6/1/2021.  Findings included a widely patent gastrojejunostomy, a solitary jejunal erosion with adherent blood just distal of the anastomosis, mild nonerosive corpus gastritis, and normal jejunal limb status post unremarkable random biopsies.  Gastric biopsies were negative for H. pylori.  A gastric emptying study and barium swallow were recommended at the time of the procedure. . Labs 4/14/2021:H/H 10.1/33, MCV 85.3, , WBC 10.5.  Ferritin 26.1.  Normal folate  . She has a complicated medical history. She was hospitalized at [facility] for abdominal pain related to acute, recurrent pancreatitis suspected to be related to familial hyperlipidemia. Her hospital course was complicated by respiratory distress syndrome requiring mechanical ventilation; prior ERCP findings raised concern for possible pancreas divisum. Due to the need for possible surgical intervention, [place] was [place] [facility]  . Per the patient, she was hospitalized at [facility] for 7 months in 2009, undergoing numerous surgeries. Initially, the head of the pancreas was removed, but symptoms persisted. This led to a splenectomy, and ultimately a total pancreatectomy. 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 Her [place] surgeries [place] Martin [place] [facility] she has since followed with Dr. Buckner and undergone multiple hernia repairs. .  .

## 2021-09-30 LAB
A/G RATIO: 1.2
ACTIN (SMOOTH MUSCLE) ANTIBODY: 9
ALBUMIN: 4.4
ALKALINE PHOSPHATASE: 219
ALT (SGPT): 56
AST (SGOT): 91
BILIRUBIN, TOTAL: 1
BUN/CREATININE RATIO: 16
BUN: 10
C-REACTIVE PROTEIN, QUANT: 5
CALCIUM: 10
CARBON DIOXIDE, TOTAL: 25
CHLORIDE: 99
CREATININE: 0.61
DEAMIDATED GLIADIN ABS, IGA: 7
DEAMIDATED GLIADIN ABS, IGG: 3
EGFR IF AFRICN AM: 121
EGFR IF NONAFRICN AM: 105
FERRITIN, SERUM: 304
FOLATE (FOLIC ACID), SERUM: 14.7
GLOBULIN, TOTAL: 3.6
GLUCOSE: 426
HBSAG SCREEN: NEGATIVE
HEMATOCRIT: 40.7
HEMOGLOBIN: 13
HEP A AB, IGM: NEGATIVE
HEP B CORE AB, IGM: NEGATIVE
HEP C VIRUS AB: 0.1
MCH: 32.3
MCHC: 31.9
MCV: 101
MITOCHONDRIAL (M2) ANTIBODY: <20
NRBC: (no result)
PLATELETS: 174
POTASSIUM: 5.7
PROTEIN, TOTAL: 8
RBC: 4.03
RDW: 12.7
SODIUM: 136
T-TRANSGLUTAMINASE (TTG) IGA: <2
T-TRANSGLUTAMINASE (TTG) IGG: 5
TSH: 3.26
VITAMIN B12: 788
VITAMIN D, 25-HYDROXY: 34.4
WBC: 8.9

## 2022-01-03 ENCOUNTER — TELEPHONE ENCOUNTER (OUTPATIENT)
Dept: URBAN - METROPOLITAN AREA CLINIC 113 | Facility: CLINIC | Age: 52
End: 2022-01-03

## 2022-01-05 ENCOUNTER — OFFICE VISIT (OUTPATIENT)
Dept: URBAN - METROPOLITAN AREA CLINIC 107 | Facility: CLINIC | Age: 52
End: 2022-01-05

## 2022-01-27 ENCOUNTER — WEB ENCOUNTER (OUTPATIENT)
Dept: URBAN - METROPOLITAN AREA CLINIC 113 | Facility: CLINIC | Age: 52
End: 2022-01-27

## 2022-02-01 ENCOUNTER — OFFICE VISIT (OUTPATIENT)
Dept: URBAN - METROPOLITAN AREA CLINIC 113 | Facility: CLINIC | Age: 52
End: 2022-02-01

## 2022-02-01 ENCOUNTER — OFFICE VISIT (OUTPATIENT)
Dept: URBAN - METROPOLITAN AREA CLINIC 113 | Facility: CLINIC | Age: 52
End: 2022-02-01
Payer: MEDICARE

## 2022-02-01 VITALS
HEIGHT: 62 IN | SYSTOLIC BLOOD PRESSURE: 136 MMHG | TEMPERATURE: 97.7 F | WEIGHT: 163 LBS | DIASTOLIC BLOOD PRESSURE: 69 MMHG | BODY MASS INDEX: 30 KG/M2 | RESPIRATION RATE: 18 BRPM | HEART RATE: 51 BPM

## 2022-02-01 DIAGNOSIS — K21.9 GERD: ICD-10-CM

## 2022-02-01 DIAGNOSIS — R10.13 EPIGASTRIC ABDOMINAL PAIN: ICD-10-CM

## 2022-02-01 DIAGNOSIS — K59.01 CONSTIPATION: ICD-10-CM

## 2022-02-01 DIAGNOSIS — R13.14 PHARYNGOESOPHAGEAL DYSPHAGIA: ICD-10-CM

## 2022-02-01 DIAGNOSIS — K43.9 VENTRAL HERNIA: ICD-10-CM

## 2022-02-01 DIAGNOSIS — K31.84 GASTROPARESIS: ICD-10-CM

## 2022-02-01 DIAGNOSIS — K76.0 HEPATIC STEATOSIS: ICD-10-CM

## 2022-02-01 DIAGNOSIS — R74.8 ELEVATED LIVER ENZYMES: ICD-10-CM

## 2022-02-01 PROBLEM — 707724006 ELEVATED LIVER ENZYMES LEVEL: Status: ACTIVE | Noted: 2021-09-28

## 2022-02-01 PROCEDURE — 99214 OFFICE O/P EST MOD 30 MIN: CPT | Performed by: INTERNAL MEDICINE

## 2022-02-01 RX ORDER — OMEPRAZOLE 40 MG/1
1 CAPSULE 30 MINUTES BEFORE MORNING MEAL CAPSULE, DELAYED RELEASE ORAL ONCE A DAY
OUTPATIENT

## 2022-02-01 RX ORDER — LINACLOTIDE 290 UG/1
1 CAPSULE AT LEAST 30 MINUTES BEFORE THE FIRST MEAL OF THE DAY ON AN EMPTY STOMACH CAPSULE, GELATIN COATED ORAL ONCE A DAY
Qty: 90 | Refills: 3

## 2022-02-01 RX ORDER — FENOFIBRATE 200 MG/1
1 CAPSULE WITH A MEAL CAPSULE ORAL ONCE A DAY
Status: ACTIVE | COMMUNITY

## 2022-02-01 RX ORDER — FENOFIBRATE 200 MG/1
1 CAPSULE WITH A MEAL CAPSULE ORAL ONCE A DAY
Status: DISCONTINUED | COMMUNITY

## 2022-02-01 RX ORDER — OMEPRAZOLE 40 MG/1
1 CAPSULE 30 MINUTES BEFORE MORNING MEAL CAPSULE, DELAYED RELEASE ORAL ONCE A DAY
Status: ACTIVE | COMMUNITY

## 2022-02-01 RX ORDER — PROMETHAZINE HYDROCHLORIDE 12.5 MG/1
1 TABLET TABLET ORAL
Qty: 45 | Refills: 4 | OUTPATIENT
Start: 2022-02-01 | End: 2022-07-01

## 2022-02-01 RX ORDER — LINACLOTIDE 290 UG/1
1 CAPSULE AT LEAST 30 MINUTES BEFORE THE FIRST MEAL OF THE DAY ON AN EMPTY STOMACH CAPSULE, GELATIN COATED ORAL ONCE A DAY
Qty: 30 | Refills: 6 | Status: ACTIVE | COMMUNITY

## 2022-02-01 RX ORDER — PANCRELIPASE 36000; 180000; 114000 [USP'U]/1; [USP'U]/1; [USP'U]/1
AS DIRECTED CAPSULE, DELAYED RELEASE PELLETS ORAL
Status: ACTIVE | COMMUNITY

## 2022-02-01 RX ORDER — ONDANSETRON 8 MG/1
1 TABLET ON THE TONGUE AND ALLOW TO DISSOLVE  AS NEEDED TABLET, ORALLY DISINTEGRATING ORAL
Qty: 45 | Refills: 6 | OUTPATIENT
Start: 2022-02-01

## 2022-02-01 RX ORDER — INSULIN GLARGINE 100 [IU]/ML
AS DIRECTED INJECTION, SOLUTION SUBCUTANEOUS
Status: DISCONTINUED | COMMUNITY

## 2022-02-01 RX ORDER — FAMOTIDINE 40 MG/1
1 TABLET TABLET, FILM COATED ORAL
Qty: 90 | Refills: 3

## 2022-02-01 RX ORDER — CITALOPRAM HYDROBROMIDE 40 MG/1
0.5 TABLET TABLET, FILM COATED ORAL ONCE A DAY
Status: ACTIVE | COMMUNITY

## 2022-02-01 RX ORDER — DICYCLOMINE HYDROCHLORIDE 20 MG/1
1 TABLET TABLET ORAL
Qty: 100 | Refills: 3

## 2022-02-01 RX ORDER — METOPROLOL SUCCINATE 50 MG/1
1 TABLET TABLET, FILM COATED, EXTENDED RELEASE ORAL ONCE A DAY
Status: ACTIVE | COMMUNITY

## 2022-02-01 RX ORDER — FAMOTIDINE 20 MG/1
1 TABLET AT BEDTIME AS NEEDED TABLET, FILM COATED ORAL ONCE A DAY
Status: ACTIVE | COMMUNITY

## 2022-02-01 RX ORDER — HYDROCHLOROTHIAZIDE 12.5 MG/1
1 TABLET IN THE MORNING TABLET ORAL ONCE A DAY
Status: ACTIVE | COMMUNITY

## 2022-02-01 RX ORDER — BUPROPION HYDROCHLORIDE 150 MG/1
1 TABLET IN THE MORNING TABLET, FILM COATED, EXTENDED RELEASE ORAL ONCE A DAY
Status: ACTIVE | COMMUNITY

## 2022-02-01 RX ORDER — HUMAN INSULIN 100 [IU]/ML
AS DIRECTED INJECTION, SUSPENSION SUBCUTANEOUS
Status: ACTIVE | COMMUNITY

## 2022-02-01 RX ORDER — INSULIN DEGLUDEC INJECTION 200 U/ML
AS DIRECTED INJECTION, SOLUTION SUBCUTANEOUS
Status: ACTIVE | COMMUNITY

## 2022-02-01 RX ORDER — PANTOPRAZOLE SODIUM 40 MG/1
1 TABLET TABLET, DELAYED RELEASE ORAL TWICE DAILY
Qty: 180 | Refills: 3

## 2022-02-01 RX ORDER — ROSUVASTATIN CALCIUM 5 MG/1
1 TABLET TABLET, FILM COATED ORAL ONCE A DAY
Status: ACTIVE | COMMUNITY

## 2022-02-01 RX ORDER — PANCRELIPASE 36000; 180000; 114000 [USP'U]/1; [USP'U]/1; [USP'U]/1
AS DIRECTED CAPSULE, DELAYED RELEASE PELLETS ORAL
Qty: 540 | Refills: 3

## 2022-02-01 RX ORDER — TRAZODONE HYDROCHLORIDE 50 MG/1
1 TABLET AT BEDTIME AS NEEDED TABLET, FILM COATED ORAL ONCE A DAY
Status: ACTIVE | COMMUNITY

## 2022-02-01 RX ORDER — GLUCAGON INJECTION, SOLUTION 0.5 MG/.1ML
AS DIRECTED INJECTION, SOLUTION SUBCUTANEOUS
Status: DISCONTINUED | COMMUNITY

## 2022-02-01 RX ORDER — PANTOPRAZOLE SODIUM 40 MG/1
1 TABLET TABLET, DELAYED RELEASE ORAL ONCE A DAY
Qty: 30 | Refills: 11 | Status: ACTIVE | COMMUNITY
Start: 2021-09-28

## 2022-02-01 RX ORDER — ONDANSETRON HYDROCHLORIDE 4 MG/1
1 TABLET TABLET, FILM COATED ORAL ONCE A DAY
Status: DISCONTINUED | COMMUNITY

## 2022-02-01 RX ORDER — MIRABEGRON 25 MG/1
1 TABLET TABLET, FILM COATED, EXTENDED RELEASE ORAL ONCE A DAY
Status: ACTIVE | COMMUNITY

## 2022-02-01 RX ORDER — MULTIVIT-MIN/FA/LYCOPEN/LUTEIN .4-300-25
AS DIRECTED TABLET ORAL
Status: ACTIVE | COMMUNITY

## 2022-02-01 RX ORDER — DICYCLOMINE HYDROCHLORIDE 20 MG/1
1 TABLET TABLET ORAL THREE TIMES A DAY
Status: ACTIVE | COMMUNITY

## 2022-02-01 NOTE — HPI-TODAY'S VISIT:
50-year-old female with an extensive medical history to include febrile pancreatitis with SIRS and questionable pancreas divisum s/p total pancreatectomy, splenectomy, and cholecystectomy (2009, Dr. Niko Martin at [facility] [place] [facility]), and multiple abdominal hernia repairs, presenting for follow-up after EGD. . At this point, she is aching in there.  Trying to figure out a way to manage her glucose is better.  She is having nausea.  Constipation is better with milk of magnesia and Linzess.  No reports of rectal bleeding or melena. . Labs September 2021.  B12 788, CRP 5, anti-smooth muscle antibody negative, antimitochondrial antibody negative, vitamin D normal at 34, antigliadin antibody negative, antitissue transglutaminase antibody negative, glucose 426, alkaline phosphatase 219, AST 91, ALT 51, hepatitis serology negative, ferritin 504, hemoglobin 13 . I believe gastric emptying study was probably canceled because of her prior surgery.  It is self-evident she has gastroparesis. .  Abdominal pelvic September 2021.  Prior splenectomy.  Splenosis was noted.  Gastric bypass status.  Nonobstructing kidney stone.  No acute abnormalities identified . Labs August 2021.  AST 74, ALT 47, alkaline phosphatase 194 . EGD 6/1/2021.  Findings included a widely patent gastrojejunostomy, a solitary jejunal erosion with adherent blood just distal of the anastomosis, mild nonerosive corpus gastritis, and normal jejunal limb status post unremarkable random biopsies.  Gastric biopsies were negative for H. pylori.  A gastric emptying study and barium swallow were recommended at the time of the procedure. . Labs 4/14/2021:H/H 10.1/33, MCV 85.3, , WBC 10.5.  Ferritin 26.1.  Normal folate  . She has a complicated medical history. She was hospitalized at [facility] for abdominal pain related to acute, recurrent pancreatitis suspected to be related to familial hyperlipidemia. Her hospital course was complicated by respiratory distress syndrome requiring mechanical ventilation; prior ERCP findings raised concern for possible pancreas divisum. Due to the need for possible surgical intervention, she was transferred to [facility] [place] [facility].  . Per the patient, she was hospitalized at [facility] for 7 months in 2009, undergoing numerous surgeries. Initially, the head of the pancreas was removed, but symptoms persisted. This led to a splenectomy, and ultimately a total pancreatectomy. 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 Her initial surgeries were performed by Dr. Martin at [facility]; she has since followed with Dr. Buckner and undergone multiple hernia repairs. . . Colonoscopy 2021 [facility]. Normal per patient. .

## 2022-06-02 ENCOUNTER — OFFICE VISIT (OUTPATIENT)
Dept: URBAN - METROPOLITAN AREA CLINIC 113 | Facility: CLINIC | Age: 52
End: 2022-06-02

## 2022-06-02 RX ORDER — CITALOPRAM HYDROBROMIDE 40 MG/1
0.5 TABLET TABLET, FILM COATED ORAL ONCE A DAY
Status: ACTIVE | COMMUNITY

## 2022-06-02 RX ORDER — ROSUVASTATIN CALCIUM 5 MG/1
1 TABLET TABLET, FILM COATED ORAL ONCE A DAY
Status: ACTIVE | COMMUNITY

## 2022-06-02 RX ORDER — PANTOPRAZOLE SODIUM 40 MG/1
1 TABLET TABLET, DELAYED RELEASE ORAL TWICE DAILY
Qty: 180 | Refills: 3 | Status: ACTIVE | COMMUNITY

## 2022-06-02 RX ORDER — PROMETHAZINE HYDROCHLORIDE 12.5 MG/1
1 TABLET TABLET ORAL
Qty: 45 | Refills: 4 | Status: ACTIVE | COMMUNITY
Start: 2022-02-01 | End: 2022-07-01

## 2022-06-02 RX ORDER — DICYCLOMINE HYDROCHLORIDE 20 MG/1
1 TABLET TABLET ORAL
Qty: 100 | Refills: 3 | Status: ACTIVE | COMMUNITY

## 2022-06-02 RX ORDER — ONDANSETRON 8 MG/1
1 TABLET ON THE TONGUE AND ALLOW TO DISSOLVE  AS NEEDED TABLET, ORALLY DISINTEGRATING ORAL
Qty: 45 | Refills: 6 | Status: ACTIVE | COMMUNITY
Start: 2022-02-01

## 2022-06-02 RX ORDER — MIRABEGRON 25 MG/1
1 TABLET TABLET, FILM COATED, EXTENDED RELEASE ORAL ONCE A DAY
Status: ACTIVE | COMMUNITY

## 2022-06-02 RX ORDER — MULTIVIT-MIN/FA/LYCOPEN/LUTEIN .4-300-25
AS DIRECTED TABLET ORAL
Status: ACTIVE | COMMUNITY

## 2022-06-02 RX ORDER — LINACLOTIDE 290 UG/1
1 CAPSULE AT LEAST 30 MINUTES BEFORE THE FIRST MEAL OF THE DAY ON AN EMPTY STOMACH CAPSULE, GELATIN COATED ORAL ONCE A DAY
Qty: 90 | Refills: 3 | Status: ACTIVE | COMMUNITY

## 2022-06-02 RX ORDER — HYDROCHLOROTHIAZIDE 12.5 MG/1
1 TABLET IN THE MORNING TABLET ORAL ONCE A DAY
Status: ACTIVE | COMMUNITY

## 2022-06-02 RX ORDER — FENOFIBRATE 200 MG/1
1 CAPSULE WITH A MEAL CAPSULE ORAL ONCE A DAY
Status: ACTIVE | COMMUNITY

## 2022-06-02 RX ORDER — HUMAN INSULIN 100 [IU]/ML
AS DIRECTED INJECTION, SUSPENSION SUBCUTANEOUS
Status: ACTIVE | COMMUNITY

## 2022-06-02 RX ORDER — PANCRELIPASE 36000; 180000; 114000 [USP'U]/1; [USP'U]/1; [USP'U]/1
AS DIRECTED CAPSULE, DELAYED RELEASE PELLETS ORAL
Qty: 540 | Refills: 3 | Status: ACTIVE | COMMUNITY

## 2022-06-02 RX ORDER — TRAZODONE HYDROCHLORIDE 50 MG/1
1 TABLET AT BEDTIME AS NEEDED TABLET, FILM COATED ORAL ONCE A DAY
Status: ACTIVE | COMMUNITY

## 2022-06-02 RX ORDER — BUPROPION HYDROCHLORIDE 150 MG/1
1 TABLET IN THE MORNING TABLET, FILM COATED, EXTENDED RELEASE ORAL ONCE A DAY
Status: ACTIVE | COMMUNITY

## 2022-06-02 RX ORDER — INSULIN DEGLUDEC INJECTION 200 U/ML
AS DIRECTED INJECTION, SOLUTION SUBCUTANEOUS
Status: ACTIVE | COMMUNITY

## 2022-06-02 RX ORDER — METOPROLOL SUCCINATE 50 MG/1
1 TABLET TABLET, FILM COATED, EXTENDED RELEASE ORAL ONCE A DAY
Status: ACTIVE | COMMUNITY

## 2022-06-02 RX ORDER — FAMOTIDINE 40 MG/1
1 TABLET TABLET, FILM COATED ORAL
Qty: 90 | Refills: 3 | Status: ACTIVE | COMMUNITY

## 2022-07-19 ENCOUNTER — TELEPHONE ENCOUNTER (OUTPATIENT)
Dept: URBAN - METROPOLITAN AREA CLINIC 113 | Facility: CLINIC | Age: 52
End: 2022-07-19

## 2022-07-25 ENCOUNTER — TELEPHONE ENCOUNTER (OUTPATIENT)
Dept: URBAN - METROPOLITAN AREA CLINIC 113 | Facility: CLINIC | Age: 52
End: 2022-07-25

## 2022-07-25 ENCOUNTER — LAB OUTSIDE AN ENCOUNTER (OUTPATIENT)
Dept: URBAN - METROPOLITAN AREA CLINIC 113 | Facility: CLINIC | Age: 52
End: 2022-07-25

## 2022-07-26 ENCOUNTER — TELEPHONE ENCOUNTER (OUTPATIENT)
Dept: URBAN - METROPOLITAN AREA CLINIC 113 | Facility: CLINIC | Age: 52
End: 2022-07-26

## 2022-08-11 ENCOUNTER — TELEPHONE ENCOUNTER (OUTPATIENT)
Dept: URBAN - METROPOLITAN AREA CLINIC 113 | Facility: CLINIC | Age: 52
End: 2022-08-11

## 2022-08-11 RX ORDER — FAMOTIDINE 40 MG/1
1 TABLET TABLET, FILM COATED ORAL
Qty: 90 | Refills: 3 | Status: ACTIVE | COMMUNITY

## 2022-08-11 RX ORDER — LINACLOTIDE 290 UG/1
1 CAPSULE AT LEAST 30 MINUTES BEFORE THE FIRST MEAL OF THE DAY ON AN EMPTY STOMACH CAPSULE, GELATIN COATED ORAL ONCE A DAY
Qty: 90 | Refills: 3 | Status: ACTIVE | COMMUNITY

## 2022-08-11 RX ORDER — LACTULOSE SOLUTION USP, 10 G/15 ML 10 G/15ML
10 ML SOLUTION ORAL; RECTAL
Qty: 360 ML | Refills: 5 | OUTPATIENT
Start: 2022-08-11 | End: 2023-02-06

## 2022-08-11 RX ORDER — PANCRELIPASE 36000; 180000; 114000 [USP'U]/1; [USP'U]/1; [USP'U]/1
AS DIRECTED CAPSULE, DELAYED RELEASE PELLETS ORAL
Qty: 540 | Refills: 3 | Status: ACTIVE | COMMUNITY

## 2022-08-11 RX ORDER — FENOFIBRATE 200 MG/1
1 CAPSULE WITH A MEAL CAPSULE ORAL ONCE A DAY
Status: ACTIVE | COMMUNITY

## 2022-08-11 RX ORDER — DICYCLOMINE HYDROCHLORIDE 20 MG/1
1 TABLET TABLET ORAL
Qty: 100 | Refills: 3 | Status: ACTIVE | COMMUNITY

## 2022-08-11 RX ORDER — HUMAN INSULIN 100 [IU]/ML
AS DIRECTED INJECTION, SUSPENSION SUBCUTANEOUS
Status: ACTIVE | COMMUNITY

## 2022-08-11 RX ORDER — METOPROLOL SUCCINATE 50 MG/1
1 TABLET TABLET, FILM COATED, EXTENDED RELEASE ORAL ONCE A DAY
Status: ACTIVE | COMMUNITY

## 2022-08-11 RX ORDER — TRAZODONE HYDROCHLORIDE 50 MG/1
1 TABLET AT BEDTIME AS NEEDED TABLET, FILM COATED ORAL ONCE A DAY
Status: ACTIVE | COMMUNITY

## 2022-08-11 RX ORDER — MIRABEGRON 25 MG/1
1 TABLET TABLET, FILM COATED, EXTENDED RELEASE ORAL ONCE A DAY
Status: ACTIVE | COMMUNITY

## 2022-08-11 RX ORDER — HYDROCHLOROTHIAZIDE 12.5 MG/1
1 TABLET IN THE MORNING TABLET ORAL ONCE A DAY
Status: ACTIVE | COMMUNITY

## 2022-08-11 RX ORDER — CITALOPRAM HYDROBROMIDE 40 MG/1
0.5 TABLET TABLET, FILM COATED ORAL ONCE A DAY
Status: ACTIVE | COMMUNITY

## 2022-08-11 RX ORDER — INSULIN DEGLUDEC INJECTION 200 U/ML
AS DIRECTED INJECTION, SOLUTION SUBCUTANEOUS
Status: ACTIVE | COMMUNITY

## 2022-08-11 RX ORDER — BUPROPION HYDROCHLORIDE 150 MG/1
1 TABLET IN THE MORNING TABLET, FILM COATED, EXTENDED RELEASE ORAL ONCE A DAY
Status: ACTIVE | COMMUNITY

## 2022-08-11 RX ORDER — PANTOPRAZOLE SODIUM 40 MG/1
1 TABLET TABLET, DELAYED RELEASE ORAL TWICE DAILY
Qty: 180 | Refills: 3 | Status: ACTIVE | COMMUNITY

## 2022-08-11 RX ORDER — ROSUVASTATIN CALCIUM 5 MG/1
1 TABLET TABLET, FILM COATED ORAL ONCE A DAY
Status: ACTIVE | COMMUNITY

## 2022-08-11 RX ORDER — MULTIVIT-MIN/FA/LYCOPEN/LUTEIN .4-300-25
AS DIRECTED TABLET ORAL
Status: ACTIVE | COMMUNITY

## 2022-08-11 RX ORDER — ONDANSETRON 8 MG/1
1 TABLET ON THE TONGUE AND ALLOW TO DISSOLVE  AS NEEDED TABLET, ORALLY DISINTEGRATING ORAL
Qty: 45 | Refills: 6 | Status: ACTIVE | COMMUNITY
Start: 2022-02-01

## 2022-08-15 ENCOUNTER — ERX REFILL RESPONSE (OUTPATIENT)
Dept: URBAN - METROPOLITAN AREA CLINIC 113 | Facility: CLINIC | Age: 52
End: 2022-08-15

## 2022-08-15 RX ORDER — RIFAXIMIN 550 MG/1
TAKE 1 TABLET  BY MOUTH IN THE MORNING AND 1 TABLET BEFORE BEDTIME. DO ALL THIS FOR 30 DAYS TABLET ORAL
Qty: 60 TABLET | Refills: 1 | OUTPATIENT

## 2022-08-15 RX ORDER — RIFAXIMIN 550 MG/1
TAKE 1 TABLET  BY MOUTH IN THE MORNING AND 1 TABLET BEFORE BEDTIME. DO ALL THIS FOR 30 DAYS TABLET ORAL
Qty: 60 TABLET | Refills: 11 | OUTPATIENT

## 2022-08-17 ENCOUNTER — TELEPHONE ENCOUNTER (OUTPATIENT)
Dept: URBAN - METROPOLITAN AREA CLINIC 113 | Facility: CLINIC | Age: 52
End: 2022-08-17

## 2022-08-17 RX ORDER — PANCRELIPASE 36000; 180000; 114000 [USP'U]/1; [USP'U]/1; [USP'U]/1
AS DIRECTED CAPSULE, DELAYED RELEASE PELLETS ORAL
Qty: 540 | Refills: 3
End: 2023-08-12

## 2022-08-18 ENCOUNTER — TELEPHONE ENCOUNTER (OUTPATIENT)
Dept: URBAN - METROPOLITAN AREA CLINIC 113 | Facility: CLINIC | Age: 52
End: 2022-08-18

## 2022-08-18 ENCOUNTER — OFFICE VISIT (OUTPATIENT)
Dept: URBAN - METROPOLITAN AREA CLINIC 113 | Facility: CLINIC | Age: 52
End: 2022-08-18
Payer: MEDICARE

## 2022-08-18 VITALS
DIASTOLIC BLOOD PRESSURE: 81 MMHG | TEMPERATURE: 97.8 F | BODY MASS INDEX: 27.97 KG/M2 | WEIGHT: 152 LBS | SYSTOLIC BLOOD PRESSURE: 160 MMHG | HEIGHT: 62 IN | HEART RATE: 69 BPM

## 2022-08-18 DIAGNOSIS — K72.90 HEPATIC ENCEPHALOPATHY: ICD-10-CM

## 2022-08-18 DIAGNOSIS — K59.01 CONSTIPATION: ICD-10-CM

## 2022-08-18 DIAGNOSIS — K76.9 LIVER LESION: ICD-10-CM

## 2022-08-18 DIAGNOSIS — K43.9 VENTRAL HERNIA: ICD-10-CM

## 2022-08-18 DIAGNOSIS — K21.9 GERD: ICD-10-CM

## 2022-08-18 DIAGNOSIS — R11.2 NAUSEA WITH VOMITING, UNSPECIFIED: ICD-10-CM

## 2022-08-18 DIAGNOSIS — K31.84 GASTROPARESIS: ICD-10-CM

## 2022-08-18 DIAGNOSIS — K74.69 CIRRHOSIS, CRYPTOGENIC: ICD-10-CM

## 2022-08-18 DIAGNOSIS — R10.84 GENERALIZED ABDOMINAL PAIN: ICD-10-CM

## 2022-08-18 PROBLEM — 235675006 GASTROPARESIS: Status: ACTIVE | Noted: 2022-02-01

## 2022-08-18 PROBLEM — 414396006 VENTRAL HERNIA: Status: ACTIVE | Noted: 2022-02-01

## 2022-08-18 PROBLEM — 300331000 LESION OF LIVER: Status: ACTIVE | Noted: 2022-08-18

## 2022-08-18 PROBLEM — 14760008 CONSTIPATION: Status: ACTIVE | Noted: 2021-06-01

## 2022-08-18 PROBLEM — 235595009 GASTROESOPHAGEAL REFLUX DISEASE: Status: ACTIVE | Noted: 2021-05-19

## 2022-08-18 PROCEDURE — 99214 OFFICE O/P EST MOD 30 MIN: CPT | Performed by: NURSE PRACTITIONER

## 2022-08-18 RX ORDER — LINACLOTIDE 290 UG/1
1 CAPSULE AT LEAST 30 MINUTES BEFORE THE FIRST MEAL OF THE DAY ON AN EMPTY STOMACH CAPSULE, GELATIN COATED ORAL ONCE A DAY
Qty: 90 | Refills: 3 | Status: ACTIVE | COMMUNITY

## 2022-08-18 RX ORDER — HUMAN INSULIN 100 [IU]/ML
AS DIRECTED INJECTION, SUSPENSION SUBCUTANEOUS
Status: ACTIVE | COMMUNITY

## 2022-08-18 RX ORDER — DICYCLOMINE HYDROCHLORIDE 20 MG/1
1 TABLET TABLET ORAL
Qty: 100 | Refills: 3 | Status: ACTIVE | COMMUNITY

## 2022-08-18 RX ORDER — LACTULOSE SOLUTION USP, 10 G/15 ML 10 G/15ML
10 ML SOLUTION ORAL; RECTAL
Qty: 360 ML | Refills: 5 | Status: ACTIVE | COMMUNITY
Start: 2022-08-11 | End: 2023-02-06

## 2022-08-18 RX ORDER — INSULIN DEGLUDEC INJECTION 200 U/ML
AS DIRECTED INJECTION, SOLUTION SUBCUTANEOUS
Status: ACTIVE | COMMUNITY

## 2022-08-18 RX ORDER — CITALOPRAM HYDROBROMIDE 40 MG/1
0.5 TABLET TABLET, FILM COATED ORAL ONCE A DAY
Status: ACTIVE | COMMUNITY

## 2022-08-18 RX ORDER — PANCRELIPASE 36000; 180000; 114000 [USP'U]/1; [USP'U]/1; [USP'U]/1
AS DIRECTED CAPSULE, DELAYED RELEASE PELLETS ORAL
Qty: 540 | Refills: 3 | Status: ACTIVE | COMMUNITY
End: 2023-08-12

## 2022-08-18 RX ORDER — FAMOTIDINE 40 MG/1
1 TABLET TABLET, FILM COATED ORAL
Qty: 90 | Refills: 3 | Status: ACTIVE | COMMUNITY

## 2022-08-18 RX ORDER — PANTOPRAZOLE SODIUM 40 MG/1
1 TABLET TABLET, DELAYED RELEASE ORAL TWICE DAILY
Qty: 180 | Refills: 3 | Status: ACTIVE | COMMUNITY

## 2022-08-18 RX ORDER — HYDROCHLOROTHIAZIDE 12.5 MG/1
1 TABLET IN THE MORNING TABLET ORAL ONCE A DAY
Status: ACTIVE | COMMUNITY

## 2022-08-18 RX ORDER — MULTIVIT-MIN/FA/LYCOPEN/LUTEIN .4-300-25
AS DIRECTED TABLET ORAL
Status: ACTIVE | COMMUNITY

## 2022-08-18 RX ORDER — RIFAXIMIN 550 MG/1
TAKE 1 TABLET  BY MOUTH IN THE MORNING AND 1 TABLET BEFORE BEDTIME. DO ALL THIS FOR 30 DAYS TABLET ORAL
Qty: 60 TABLET | Refills: 11 | Status: ACTIVE | COMMUNITY

## 2022-08-18 RX ORDER — ROSUVASTATIN CALCIUM 5 MG/1
1 TABLET TABLET, FILM COATED ORAL ONCE A DAY
Status: ACTIVE | COMMUNITY

## 2022-08-18 RX ORDER — ONDANSETRON 8 MG/1
1 TABLET ON THE TONGUE AND ALLOW TO DISSOLVE  AS NEEDED TABLET, ORALLY DISINTEGRATING ORAL
Qty: 45 | Refills: 6 | Status: ACTIVE | COMMUNITY
Start: 2022-02-01

## 2022-08-18 RX ORDER — METOPROLOL SUCCINATE 50 MG/1
1 TABLET TABLET, FILM COATED, EXTENDED RELEASE ORAL ONCE A DAY
Status: ACTIVE | COMMUNITY

## 2022-08-18 RX ORDER — MIRABEGRON 25 MG/1
1 TABLET TABLET, FILM COATED, EXTENDED RELEASE ORAL ONCE A DAY
Status: ACTIVE | COMMUNITY

## 2022-08-18 RX ORDER — BUPROPION HYDROCHLORIDE 150 MG/1
1 TABLET IN THE MORNING TABLET, FILM COATED, EXTENDED RELEASE ORAL ONCE A DAY
Status: ACTIVE | COMMUNITY

## 2022-08-18 RX ORDER — FENOFIBRATE 200 MG/1
1 CAPSULE WITH A MEAL CAPSULE ORAL ONCE A DAY
Status: ACTIVE | COMMUNITY

## 2022-08-18 NOTE — PHYSICAL EXAM EYES:
Conjuntivae and eyelids appear normal , Sclerae : White without injection, no icterus. Principal Discharge DX:	Fever

## 2022-08-18 NOTE — HPI-OTHER HISTORIES
Labs September 2021.  B12 788, CRP 5, anti-smooth muscle antibody negative, antimitochondrial antibody negative, vitamin D normal at 34, antigliadin antibody negative, antitissue transglutaminase antibody negative, glucose 426, alkaline phosphatase 219, AST 91, ALT 51, hepatitis serology negative, ferritin 504, hemoglobin 13 CT a/p wo contrast September 2021.  Prior splenectomy.  Splenosis was noted.  Gastric bypass status.  Nonobstructing kidney stone.  No acute abnormalities identified Labs August 2021.  AST 74, ALT 47, alkaline phosphatase 194 Colonoscopy 2021 [facility]. Normal per patient. The EGD was performed on 6/1/2021.  Findings included a widely patent gastrojejunostomy, a solitary jejunal erosion with adherent blood just distal of the anastomosis, mild nonerosive corpus gastritis, and normal jejunal limb status post unremarkable random biopsies.  Gastric biopsies were negative for H. pylori.  Labs 4/14/2021:H/H 10.1/33, MCV 85.3, , WBC 10.5.  Ferritin 26.1.  Normal folate and vitamin B12.  AST 67, ALT 29, , T bili 0.7, CMP otherwise unremarkable aside from glucose 124. She has a complicated medical history, and unfortunately is a fairly poor historian. Per a consult note from Dr. Bess in 2009, she was hospitalized at [facility] for abdominal pain related to acute, recurrent pancreatitis suspected to be related to familial hyperlipidemia. Her hospital course was complicated by respiratory distress syndrome requiring mechanical ventilation; prior ERCP findings raised concern for possible pancreas divisum. Due to the need for possible surgical intervention, she was transferred to [facility].  Per the patient, she was hospitalized at [facility] for 7 months, undergoing numerous surgeries. Initially, the head of the pancreas was removed, but symptoms persisted. This led to a splenectomy, and ultimately a total pancreatectomy. 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 Her initial surgeries were performed by Dr. Martin at [facility]; she has since followed with Dr. Buckner and undergone multiple hernia repairs.

## 2022-08-18 NOTE — HPI-TODAY'S VISIT:
50-year-old female with an extensive medical history to include febrile pancreatitis with SIRS and questionable pancreas divisum s/p total pancreatectomy, splenectomy, and cholecystectomy (2009, Dr. Niko Martin at [facility] multiple abdominal hernia repairs, presenting for follow-up after hospitalization for newly diagnosed cirrhosis and hepatic encephalopathy. At the time of her last visit in February 2022, her abdominal pain and reflux symptoms were overall stable on acid suppression.  Antiemetics were provided to use as needed for nausea. She was to continue Linzess and MOM for constipation. She was recently hospitalized at [facility] following transfer from an outside hospital due to hepatic encephalopathy and a stable liver mass noted on imaging.  Follow-up MRI as outlined below.  Her confusion responded to lactulose and rifaximin. MRI of the abdomen with and without contrast 7/24/2022 showed cirrhosis with a small amount of ascites and intrahepatic lesions consistent with splenosis. Abdominal ultrasound 7/22/2022 showed cirrhotic hepatic morphology with a 1.7 cm hypoechoic mass in the left hepatic lobe CT of the abdomen and pelvis without contrast 7/21/2022 showed multiple stable hyperattenuating lesions along the liver capsule, largest measuring 1.9 cm, consistent with spondylosis.  Scant perihepatic ascites.  Atrophic pancreas with postsurgical changes/clips.  Status post cholecystectomy and splenectomy with multiple splenules noted.  Postsurgical changes of the stomach and small bowel consistent with gastric bypass.  Unchanged stranding within the mesentery and retroperitoneum.  No acute process. Labs 7/1/2022:H/H11.5/34.9, MCV 98.6, , WBC 8.6.  AST 74, ALT 27, , T bili 1.4, albumin 4, total protein 8.2, CMP otherwise unremarkable aside from glucose 153.  Negative hepatitis A antibody IgM, hepatitis B core antibody IgM, hepatitis B surface antigen, hepatitis C antibody.  Normal vitamin B12 and folate.  Prior to the hospitalization, she was experiencing significant confusion and exhibiting bizarre behaviors. This has mostly resolved with lactulose and Xifaxan. She has occasional difficulty formulating words/completing a sentence, but otherwise denies further erratic behavior. She does not drive. She is having 3-4 nonbloody stools per day. She continues to experience constant abdominal pain, worse in the mid and left side abdomen. She has nausea most evenings with dinner. Ondansetron and promethazine do provide relief. She thinks she is still taking the pantoprazole and famotidine. She remains on Linzess and Creon and is taking lactulose 15mL tid.

## 2022-08-18 NOTE — PHYSICAL EXAM GASTROINTESTINAL
soft, nondistended , normal bowel sounds. diffusely tender to palpation, large ventral hernia, no palpable ascites

## 2022-08-22 ENCOUNTER — TELEPHONE ENCOUNTER (OUTPATIENT)
Dept: URBAN - METROPOLITAN AREA CLINIC 113 | Facility: CLINIC | Age: 52
End: 2022-08-22

## 2022-08-29 ENCOUNTER — ERX REFILL RESPONSE (OUTPATIENT)
Dept: URBAN - METROPOLITAN AREA CLINIC 113 | Facility: CLINIC | Age: 52
End: 2022-08-29

## 2022-08-29 RX ORDER — PANCRELIPASE 36000; 180000; 114000 [USP'U]/1; [USP'U]/1; [USP'U]/1
TWO TABLETS CAPSULE, DELAYED RELEASE PELLETS ORAL
Qty: 540 | Refills: 3 | OUTPATIENT

## 2022-08-29 RX ORDER — PANCRELIPASE 36000; 180000; 114000 [USP'U]/1; [USP'U]/1; [USP'U]/1
AS DIRECTED CAPSULE, DELAYED RELEASE PELLETS ORAL
Qty: 540 | Refills: 3 | OUTPATIENT

## 2022-09-06 ENCOUNTER — TELEPHONE ENCOUNTER (OUTPATIENT)
Dept: URBAN - METROPOLITAN AREA CLINIC 113 | Facility: CLINIC | Age: 52
End: 2022-09-06

## 2022-09-06 RX ORDER — FAMOTIDINE 40 MG/1
1 TABLET TABLET, FILM COATED ORAL
Qty: 90 | Refills: 3

## 2022-09-06 RX ORDER — LINACLOTIDE 290 UG/1
1 CAPSULE AT LEAST 30 MINUTES BEFORE THE FIRST MEAL OF THE DAY ON AN EMPTY STOMACH CAPSULE, GELATIN COATED ORAL ONCE A DAY
Qty: 90 | Refills: 3
End: 2023-09-01

## 2022-09-06 RX ORDER — PANTOPRAZOLE SODIUM 40 MG/1
1 TABLET TABLET, DELAYED RELEASE ORAL TWICE DAILY
Qty: 180 | Refills: 3

## 2022-09-06 RX ORDER — DICYCLOMINE HYDROCHLORIDE 20 MG/1
1 TABLET TABLET ORAL
Qty: 100 | Refills: 3
End: 2023-09-01

## 2022-09-07 ENCOUNTER — WEB ENCOUNTER (OUTPATIENT)
Dept: URBAN - METROPOLITAN AREA CLINIC 113 | Facility: CLINIC | Age: 52
End: 2022-09-07

## 2022-09-07 RX ORDER — LINACLOTIDE 290 UG/1
1 CAPSULE AT LEAST 30 MINUTES BEFORE THE FIRST MEAL OF THE DAY ON AN EMPTY STOMACH CAPSULE, GELATIN COATED ORAL ONCE A DAY
Qty: 90 | Refills: 3 | OUTPATIENT
Start: 2022-09-08 | End: 2023-09-03

## 2022-09-07 RX ORDER — PANCRELIPASE 36000; 180000; 114000 [USP'U]/1; [USP'U]/1; [USP'U]/1
AS DIRECTED CAPSULE, DELAYED RELEASE PELLETS ORAL
Qty: 990 | Refills: 3 | OUTPATIENT
Start: 2022-09-08 | End: 2023-09-03

## 2022-09-07 RX ORDER — ONDANSETRON 8 MG/1
1 TABLET ON THE TONGUE TABLET, ORALLY DISINTEGRATING ORAL
Qty: 90 | Refills: 3 | OUTPATIENT
Start: 2022-09-08

## 2022-09-07 RX ORDER — PANTOPRAZOLE SODIUM 40 MG/1
TAKE 1 TABLET DAILY TABLET, DELAYED RELEASE ORAL TWICE DAILY
Qty: 180 | Refills: 3 | OUTPATIENT
Start: 2022-09-08

## 2022-09-07 RX ORDER — FAMOTIDINE 40 MG/1
1 TABLET AT BEDTIME TABLET, FILM COATED ORAL ONCE A DAY
Qty: 90 | Refills: 3 | OUTPATIENT
Start: 2022-09-08

## 2022-09-07 RX ORDER — DICYCLOMINE HYDROCHLORIDE 20 MG/1
1 TABLET TABLET ORAL
Qty: 90 | Refills: 3 | OUTPATIENT
Start: 2022-09-08 | End: 2023-09-03

## 2022-09-08 ENCOUNTER — TELEPHONE ENCOUNTER (OUTPATIENT)
Dept: URBAN - METROPOLITAN AREA CLINIC 113 | Facility: CLINIC | Age: 52
End: 2022-09-08

## 2022-09-08 RX ORDER — LACTULOSE 10 G/15ML
45 ML SOLUTION ORAL
Qty: 12150 ML | Refills: 3 | OUTPATIENT

## 2022-09-15 ENCOUNTER — OFFICE VISIT (OUTPATIENT)
Dept: URBAN - METROPOLITAN AREA CLINIC 113 | Facility: CLINIC | Age: 52
End: 2022-09-15

## 2022-09-17 ENCOUNTER — TELEPHONE ENCOUNTER (OUTPATIENT)
Dept: URBAN - METROPOLITAN AREA CLINIC 113 | Facility: CLINIC | Age: 52
End: 2022-09-17

## 2022-09-22 ENCOUNTER — OFFICE VISIT (OUTPATIENT)
Dept: URBAN - METROPOLITAN AREA CLINIC 113 | Facility: CLINIC | Age: 52
End: 2022-09-22
Payer: MEDICARE

## 2022-09-22 VITALS — BODY MASS INDEX: 28.71 KG/M2 | RESPIRATION RATE: 20 BRPM | HEIGHT: 62 IN | TEMPERATURE: 98.6 F | WEIGHT: 156 LBS

## 2022-09-22 DIAGNOSIS — R13.14 PHARYNGOESOPHAGEAL DYSPHAGIA: ICD-10-CM

## 2022-09-22 DIAGNOSIS — K59.01 CONSTIPATION: ICD-10-CM

## 2022-09-22 DIAGNOSIS — K74.60 CIRRHOSIS: ICD-10-CM

## 2022-09-22 DIAGNOSIS — R74.8 ELEVATED LIVER ENZYMES: ICD-10-CM

## 2022-09-22 DIAGNOSIS — K76.0 HEPATIC STEATOSIS: ICD-10-CM

## 2022-09-22 DIAGNOSIS — K21.9 GERD: ICD-10-CM

## 2022-09-22 DIAGNOSIS — K43.9 VENTRAL HERNIA: ICD-10-CM

## 2022-09-22 DIAGNOSIS — K31.84 GASTROPARESIS: ICD-10-CM

## 2022-09-22 DIAGNOSIS — K76.9 LIVER LESION: ICD-10-CM

## 2022-09-22 DIAGNOSIS — R10.13 EPIGASTRIC ABDOMINAL PAIN: ICD-10-CM

## 2022-09-22 PROCEDURE — 99214 OFFICE O/P EST MOD 30 MIN: CPT | Performed by: INTERNAL MEDICINE

## 2022-09-22 RX ORDER — INSULIN DEGLUDEC INJECTION 200 U/ML
AS DIRECTED INJECTION, SOLUTION SUBCUTANEOUS
Status: ACTIVE | COMMUNITY

## 2022-09-22 RX ORDER — ONDANSETRON 8 MG/1
1 TABLET ON THE TONGUE AND ALLOW TO DISSOLVE  AS NEEDED TABLET, ORALLY DISINTEGRATING ORAL
Qty: 45 | Refills: 6 | Status: ACTIVE | COMMUNITY
Start: 2022-02-01

## 2022-09-22 RX ORDER — LACTULOSE 10 G/15ML
45 ML SOLUTION ORAL
Qty: 12150 ML | Refills: 3 | Status: ACTIVE | COMMUNITY

## 2022-09-22 RX ORDER — FENOFIBRATE 200 MG/1
1 CAPSULE WITH A MEAL CAPSULE ORAL ONCE A DAY
Status: ACTIVE | COMMUNITY

## 2022-09-22 RX ORDER — METOPROLOL SUCCINATE 50 MG/1
1 TABLET TABLET, FILM COATED, EXTENDED RELEASE ORAL ONCE A DAY
Status: ACTIVE | COMMUNITY

## 2022-09-22 RX ORDER — RIFAXIMIN 550 MG/1
TAKE 1 TABLET  BY MOUTH IN THE MORNING AND 1 TABLET BEFORE BEDTIME. DO ALL THIS FOR 30 DAYS TABLET ORAL
Qty: 60 TABLET | Refills: 11 | Status: ACTIVE | COMMUNITY

## 2022-09-22 RX ORDER — PROMETHAZINE HYDROCHLORIDE 12.5 MG/1
1 TABLET AS NEEDED TABLET ORAL
Status: ACTIVE | COMMUNITY

## 2022-09-22 RX ORDER — MIRABEGRON 25 MG/1
1 TABLET TABLET, FILM COATED, EXTENDED RELEASE ORAL ONCE A DAY
Status: ACTIVE | COMMUNITY

## 2022-09-22 RX ORDER — PANCRELIPASE 36000; 180000; 114000 [USP'U]/1; [USP'U]/1; [USP'U]/1
AS DIRECTED CAPSULE, DELAYED RELEASE PELLETS ORAL
Qty: 990 | Refills: 3 | Status: ACTIVE | COMMUNITY
Start: 2022-09-08 | End: 2023-09-03

## 2022-09-22 RX ORDER — PANCRELIPASE 36000; 180000; 114000 [USP'U]/1; [USP'U]/1; [USP'U]/1
TWO TABLETS CAPSULE, DELAYED RELEASE PELLETS ORAL
Qty: 540 | Refills: 3 | Status: ACTIVE | COMMUNITY

## 2022-09-22 RX ORDER — HYDROCHLOROTHIAZIDE 12.5 MG/1
1 TABLET IN THE MORNING TABLET ORAL ONCE A DAY
Status: ACTIVE | COMMUNITY

## 2022-09-22 RX ORDER — CITALOPRAM HYDROBROMIDE 40 MG/1
0.5 TABLET TABLET, FILM COATED ORAL ONCE A DAY
Status: ACTIVE | COMMUNITY

## 2022-09-22 RX ORDER — LACTULOSE SOLUTION USP, 10 G/15 ML 10 G/15ML
10 ML SOLUTION ORAL; RECTAL
Qty: 360 ML | Refills: 5 | Status: ACTIVE | COMMUNITY
Start: 2022-08-11 | End: 2023-02-06

## 2022-09-22 RX ORDER — MULTIVIT-MIN/FA/LYCOPEN/LUTEIN .4-300-25
AS DIRECTED TABLET ORAL
Status: ACTIVE | COMMUNITY

## 2022-09-22 RX ORDER — LINACLOTIDE 290 UG/1
1 CAPSULE AT LEAST 30 MINUTES BEFORE THE FIRST MEAL OF THE DAY ON AN EMPTY STOMACH CAPSULE, GELATIN COATED ORAL ONCE A DAY
Qty: 90 | Refills: 3 | Status: ON HOLD | COMMUNITY
Start: 2022-09-08 | End: 2023-09-03

## 2022-09-22 RX ORDER — BUPROPION HYDROCHLORIDE 150 MG/1
1 TABLET IN THE MORNING TABLET, FILM COATED, EXTENDED RELEASE ORAL ONCE A DAY
Status: ACTIVE | COMMUNITY

## 2022-09-22 RX ORDER — FAMOTIDINE 40 MG/1
1 TABLET AT BEDTIME TABLET, FILM COATED ORAL ONCE A DAY
Qty: 90 | Refills: 3 | Status: ACTIVE | COMMUNITY
Start: 2022-09-08

## 2022-09-22 RX ORDER — PANTOPRAZOLE SODIUM 40 MG/1
1 TABLET TABLET, DELAYED RELEASE ORAL TWICE DAILY
Qty: 180 | Refills: 3 | Status: ON HOLD | COMMUNITY

## 2022-09-22 RX ORDER — HUMAN INSULIN 100 [IU]/ML
AS DIRECTED INJECTION, SUSPENSION SUBCUTANEOUS
Status: ACTIVE | COMMUNITY

## 2022-09-22 RX ORDER — DICYCLOMINE HYDROCHLORIDE 20 MG/1
1 TABLET TABLET ORAL
Qty: 100 | Refills: 3 | Status: ON HOLD | COMMUNITY
End: 2023-09-01

## 2022-09-22 RX ORDER — ONDANSETRON 8 MG/1
1 TABLET ON THE TONGUE TABLET, ORALLY DISINTEGRATING ORAL
Qty: 90 | Refills: 3 | Status: ON HOLD | COMMUNITY
Start: 2022-09-08

## 2022-09-22 RX ORDER — LINACLOTIDE 290 UG/1
1 CAPSULE AT LEAST 30 MINUTES BEFORE THE FIRST MEAL OF THE DAY ON AN EMPTY STOMACH CAPSULE, GELATIN COATED ORAL ONCE A DAY
Qty: 90 | Refills: 3 | Status: ON HOLD | COMMUNITY
End: 2023-09-01

## 2022-09-22 RX ORDER — DICYCLOMINE HYDROCHLORIDE 20 MG/1
1 TABLET TABLET ORAL
Qty: 90 | Refills: 3 | Status: ON HOLD | COMMUNITY
Start: 2022-09-08 | End: 2023-09-03

## 2022-09-22 RX ORDER — FAMOTIDINE 40 MG/1
1 TABLET TABLET, FILM COATED ORAL
Qty: 90 | Refills: 3 | Status: ON HOLD | COMMUNITY

## 2022-09-22 RX ORDER — PANTOPRAZOLE SODIUM 40 MG/1
TAKE 1 TABLET DAILY TABLET, DELAYED RELEASE ORAL TWICE DAILY
Qty: 180 | Refills: 3 | Status: ACTIVE | COMMUNITY
Start: 2022-09-08

## 2022-09-22 RX ORDER — ROSUVASTATIN CALCIUM 5 MG/1
1 TABLET TABLET, FILM COATED ORAL ONCE A DAY
Status: ACTIVE | COMMUNITY

## 2022-09-22 NOTE — HPI-TODAY'S VISIT:
51-year-old female with an extensive medical history to include febrile pancreatitis with SIRS and questionable pancreas divisum s/p total pancreatectomy, splenectomy, and cholecystectomy (2009, [place]. Niko Martin [place] [facility] multiple abdominal hernia repairs, presenting for follow-up after hospitalization for newly diagnosed cirrhosis and hepatic encephalopathy. . Recently went to the ER at [facility].  She had a CT scan done.  Labs.  Reviewed today.  Thankfully her abdominal pain is slightly better.  She does have some epigastric and mid abdominal pain on exam.  I do not feel an incarcerated hernia.  She is able to eat.  She had a bowel movement yesterday.  Has been constipated.  Lakes takes Linzess.  Also on lactulose.  No reports of rectal bleeding or melena. . In July 2022, hospitalized at [facility] following transfer from an outside hospital due to hepatic encephalopathy and a stable liver mass noted on imaging.  Follow-up MRI as outlined below.  Her confusion responded to lactulose and rifaximin. Prior to the hospitalization, she was experiencing significant confusion and exhibiting bizarre behaviors.  . In 2009, she was hospitalized at [facility] for 7 months, undergoing numerous surgeries for pancreatitis. Initially, the head of the pancreas was removed, but symptoms persisted. This led to a splenectomy, and ultimately a total pancreatectomy. 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 Her initial surgeries were performed by Dr. Martin at [facility]; she has since followed with Dr. Buckner and undergone multiple hernia repairs. . CT scan [facility] September 17, 2022.  Cirrhosis.  Pneumobilia.  Prior splenectomy.  Multiple hyperattenuating lesions along the superior margin of the liver consistent with splenosis.  Pancreas was surgically absent.  Status post cholecystectomy.  Multiple fluid-filled loops of small intestine.  Primarily ileum in the right lower quadrant.  Nonspecific findings.  Appendix was surgically absent. . Labs September 17, 2022.  White blood cell count 12.4, hemoglobin 11.6, platelets 192,000, AST 59, , bilirubin 0.9, alkaline phosphatase 216, lipase 19. . MRI of the abdomen with and without contrast 7/24/2022 showed cirrhosis with a small amount of ascites and intrahepatic lesions consistent with splenosis. . Abdominal ultrasound 7/22/2022 showed cirrhotic hepatic morphology with a 1.7 cm hypoechoic mass in the left hepatic lobe . CT of the abdomen and pelvis without contrast 7/21/2022 showed multiple stable hyperattenuating lesions along the liver capsule, largest measuring 1.9 cm, consistent with splenosis.  Scant perihepatic ascites.  Atrophic pancreas with postsurgical changes/clips.  Status post cholecystectomy and splenectomy with multiple splenules noted.  Postsurgical changes of the stomach and small bowel consistent with gastric bypass.  Unchanged stranding within the mesentery and retroperitoneum.  No acute process. . Labs 7/1/2022:H/H11.5/34.9, MCV 98.6, , WBC 8.6.  AST 74, ALT 27, , T bili 1.4, albumin 4, total protein 8.2, CMP otherwise unremarkable aside from glucose 153.  Negative hepatitis A antibody IgM, hepatitis B core antibody IgM, hepatitis B surface antigen, hepatitis C antibody.  Normal vitamin B12 and folate.

## 2022-09-22 NOTE — HPI-OTHER HISTORIES
Labs September 2021.  B12 788, CRP 5, anti-smooth muscle antibody negative, antimitochondrial antibody negative, vitamin D normal at 34, antigliadin antibody negative, antitissue transglutaminase antibody negative, glucose 426, alkaline phosphatase 219, AST 91, ALT 51, hepatitis serology negative, ferritin 504, hemoglobin 13 . CT a/p wo contrast September 2021.  Prior splenectomy.  Splenosis was noted.  Gastric bypass status.  Nonobstructing kidney stone.  No acute abnormalities identified . Labs August 2021.  AST 74, ALT 47, alkaline phosphatase 194 . Colonoscopy 2021 Dr. Castro - reportedly normal . EGD 6/1/2021.  Findings included a widely patent gastrojejunostomy, a solitary jejunal erosion with adherent blood just distal of the anastomosis, mild nonerosive corpus gastritis, and normal jejunal limb status post unremarkable random biopsies.  Gastric biopsies were negative for H. pylori.  . Labs 4/14/2021:H/H 10.1/33, MCV 85.3, , WBC 10.5.  Ferritin 26.1.  Normal folate and vitamin B12.  AST 67, ALT 29, , T bili 0.7, CMP otherwise unremarkable aside from glucose 124. . She has a complicated medical history, and unfortunately is a fairly poor historian. Per a consult note from Dr. Bess in 2009, she was hospitalized at [facility] for abdominal pain related to acute, recurrent pancreatitis suspected to be related to familial hyperlipidemia. Her hospital course was complicated by respiratory distress syndrome requiring mechanical ventilation; prior ERCP findings raised concern for possible pancreas divisum. Due to the need for possible surgical intervention, she was transferred to [facility].  .

## 2022-10-25 ENCOUNTER — TELEPHONE ENCOUNTER (OUTPATIENT)
Dept: URBAN - METROPOLITAN AREA CLINIC 113 | Facility: CLINIC | Age: 52
End: 2022-10-25

## 2022-10-26 ENCOUNTER — TELEPHONE ENCOUNTER (OUTPATIENT)
Dept: URBAN - METROPOLITAN AREA CLINIC 113 | Facility: CLINIC | Age: 52
End: 2022-10-26

## 2022-11-13 ENCOUNTER — ERX REFILL RESPONSE (OUTPATIENT)
Dept: URBAN - METROPOLITAN AREA CLINIC 113 | Facility: CLINIC | Age: 52
End: 2022-11-13

## 2022-11-13 RX ORDER — ONDANSETRON 8 MG/1
1 TABLET ON THE TONGUE TABLET, ORALLY DISINTEGRATING ORAL
Qty: 90 | Refills: 3 | OUTPATIENT

## 2022-11-13 RX ORDER — ONDANSETRON 8 MG/1
DISSOLVE 1 TABLET ON THE  TONGUE 3 TIMES DAILY AS  NEEDED FOR NAUSEA TABLET, ORALLY DISINTEGRATING ORAL
Qty: 270 TABLET | Refills: 0 | OUTPATIENT

## 2022-11-15 ENCOUNTER — OFFICE VISIT (OUTPATIENT)
Dept: URBAN - METROPOLITAN AREA CLINIC 113 | Facility: CLINIC | Age: 52
End: 2022-11-15
Payer: MEDICARE

## 2022-11-15 VITALS
RESPIRATION RATE: 20 BRPM | DIASTOLIC BLOOD PRESSURE: 78 MMHG | HEART RATE: 81 BPM | HEIGHT: 62 IN | TEMPERATURE: 97.8 F | BODY MASS INDEX: 28.85 KG/M2 | WEIGHT: 156.8 LBS | SYSTOLIC BLOOD PRESSURE: 163 MMHG

## 2022-11-15 DIAGNOSIS — R18.8 OTHER ASCITES: ICD-10-CM

## 2022-11-15 DIAGNOSIS — K76.0 HEPATIC STEATOSIS: ICD-10-CM

## 2022-11-15 DIAGNOSIS — K74.60 CIRRHOSIS: ICD-10-CM

## 2022-11-15 DIAGNOSIS — K76.82 HEPATIC ENCEPHALOPATHY: ICD-10-CM

## 2022-11-15 PROBLEM — 197321007 STEATOSIS OF LIVER: Status: ACTIVE | Noted: 2022-02-01

## 2022-11-15 PROCEDURE — 99214 OFFICE O/P EST MOD 30 MIN: CPT | Performed by: NURSE PRACTITIONER

## 2022-11-15 RX ORDER — LACTULOSE 10 G/15ML
45 ML SOLUTION ORAL
Qty: 12150 ML | Refills: 3 | Status: ACTIVE | COMMUNITY

## 2022-11-15 RX ORDER — PANTOPRAZOLE SODIUM 40 MG/1
TAKE 1 TABLET DAILY TABLET, DELAYED RELEASE ORAL TWICE DAILY
Qty: 180 | Refills: 3 | Status: ACTIVE | COMMUNITY
Start: 2022-09-08

## 2022-11-15 RX ORDER — FAMOTIDINE 40 MG/1
1 TABLET TABLET, FILM COATED ORAL
Qty: 90 | Refills: 3 | Status: ON HOLD | COMMUNITY

## 2022-11-15 RX ORDER — MIRABEGRON 25 MG/1
1 TABLET TABLET, FILM COATED, EXTENDED RELEASE ORAL ONCE A DAY
Status: ACTIVE | COMMUNITY

## 2022-11-15 RX ORDER — HUMAN INSULIN 100 [IU]/ML
AS DIRECTED INJECTION, SUSPENSION SUBCUTANEOUS
Status: ACTIVE | COMMUNITY

## 2022-11-15 RX ORDER — FAMOTIDINE 40 MG/1
1 TABLET AT BEDTIME TABLET, FILM COATED ORAL ONCE A DAY
Qty: 90 | Refills: 3 | Status: ACTIVE | COMMUNITY
Start: 2022-09-08

## 2022-11-15 RX ORDER — LINACLOTIDE 290 UG/1
1 CAPSULE AT LEAST 30 MINUTES BEFORE THE FIRST MEAL OF THE DAY ON AN EMPTY STOMACH CAPSULE, GELATIN COATED ORAL ONCE A DAY
Qty: 90 | Refills: 3 | Status: ON HOLD | COMMUNITY
Start: 2022-09-08 | End: 2023-09-03

## 2022-11-15 RX ORDER — INSULIN DEGLUDEC INJECTION 200 U/ML
AS DIRECTED INJECTION, SOLUTION SUBCUTANEOUS
Status: ACTIVE | COMMUNITY

## 2022-11-15 RX ORDER — PROMETHAZINE HYDROCHLORIDE 12.5 MG/1
1 TABLET AS NEEDED TABLET ORAL
Status: ACTIVE | COMMUNITY

## 2022-11-15 RX ORDER — PANTOPRAZOLE SODIUM 40 MG/1
1 TABLET TABLET, DELAYED RELEASE ORAL TWICE DAILY
Qty: 180 | Refills: 3 | Status: ON HOLD | COMMUNITY

## 2022-11-15 RX ORDER — HYDROCHLOROTHIAZIDE 12.5 MG/1
1 TABLET IN THE MORNING TABLET ORAL ONCE A DAY
Status: ACTIVE | COMMUNITY

## 2022-11-15 RX ORDER — TELMISARTAN 40 MG/1
1 TABLET TABLET ORAL ONCE A DAY
Status: ACTIVE | COMMUNITY

## 2022-11-15 RX ORDER — LINACLOTIDE 290 UG/1
1 CAPSULE AT LEAST 30 MINUTES BEFORE THE FIRST MEAL OF THE DAY ON AN EMPTY STOMACH CAPSULE, GELATIN COATED ORAL ONCE A DAY
Qty: 90 | Refills: 3 | Status: ON HOLD | COMMUNITY
End: 2023-09-01

## 2022-11-15 RX ORDER — PANCRELIPASE 36000; 180000; 114000 [USP'U]/1; [USP'U]/1; [USP'U]/1
AS DIRECTED CAPSULE, DELAYED RELEASE PELLETS ORAL
Qty: 990 | Refills: 3 | Status: ACTIVE | COMMUNITY
Start: 2022-09-08 | End: 2023-09-03

## 2022-11-15 RX ORDER — DICYCLOMINE HYDROCHLORIDE 20 MG/1
1 TABLET TABLET ORAL
Qty: 90 | Refills: 3 | Status: ON HOLD | COMMUNITY
Start: 2022-09-08 | End: 2023-09-03

## 2022-11-15 RX ORDER — LACTULOSE SOLUTION USP, 10 G/15 ML 10 G/15ML
10 ML SOLUTION ORAL; RECTAL
Qty: 360 ML | Refills: 5 | Status: ACTIVE | COMMUNITY
Start: 2022-08-11 | End: 2023-02-06

## 2022-11-15 RX ORDER — DICYCLOMINE HYDROCHLORIDE 20 MG/1
1 TABLET TABLET ORAL
Qty: 100 | Refills: 3 | Status: ON HOLD | COMMUNITY
End: 2023-09-01

## 2022-11-15 RX ORDER — METOPROLOL SUCCINATE 50 MG/1
1 TABLET TABLET, FILM COATED, EXTENDED RELEASE ORAL ONCE A DAY
Status: ACTIVE | COMMUNITY

## 2022-11-15 RX ORDER — RIFAXIMIN 550 MG/1
TAKE 1 TABLET  BY MOUTH IN THE MORNING AND 1 TABLET BEFORE BEDTIME. DO ALL THIS FOR 30 DAYS TABLET ORAL
Qty: 60 TABLET | Refills: 11 | Status: ACTIVE | COMMUNITY

## 2022-11-15 RX ORDER — BUPROPION HYDROCHLORIDE 150 MG/1
1 TABLET IN THE MORNING TABLET, FILM COATED, EXTENDED RELEASE ORAL ONCE A DAY
Status: ACTIVE | COMMUNITY

## 2022-11-15 RX ORDER — ONDANSETRON 8 MG/1
DISSOLVE 1 TABLET ON THE  TONGUE 3 TIMES DAILY AS  NEEDED FOR NAUSEA TABLET, ORALLY DISINTEGRATING ORAL
Qty: 270 TABLET | Refills: 0 | Status: ACTIVE | COMMUNITY

## 2022-11-15 RX ORDER — FENOFIBRATE 200 MG/1
1 CAPSULE WITH A MEAL CAPSULE ORAL ONCE A DAY
Status: ACTIVE | COMMUNITY

## 2022-11-15 RX ORDER — ROSUVASTATIN CALCIUM 5 MG/1
1 TABLET TABLET, FILM COATED ORAL ONCE A DAY
Status: ACTIVE | COMMUNITY

## 2022-11-15 RX ORDER — MULTIVIT-MIN/FA/LYCOPEN/LUTEIN .4-300-25
AS DIRECTED TABLET ORAL
Status: ACTIVE | COMMUNITY

## 2022-11-15 RX ORDER — PANCRELIPASE 36000; 180000; 114000 [USP'U]/1; [USP'U]/1; [USP'U]/1
TWO TABLETS CAPSULE, DELAYED RELEASE PELLETS ORAL
Qty: 540 | Refills: 3 | Status: ACTIVE | COMMUNITY

## 2022-11-15 RX ORDER — CITALOPRAM HYDROBROMIDE 40 MG/1
0.5 TABLET TABLET, FILM COATED ORAL ONCE A DAY
Status: ACTIVE | COMMUNITY

## 2022-11-15 NOTE — HPI-OTHER HISTORIES
MRI of the abdomen with and without contrast 7/24/2022 showed cirrhosis with a small amount of ascites and intrahepatic lesions consistent with splenosis. Abdominal ultrasound 7/22/2022 showed cirrhotic hepatic morphology with a 1.7 cm hypoechoic mass in the left hepatic lobe CT of the abdomen and pelvis without contrast 7/21/2022 showed multiple stable hyperattenuating lesions along the liver capsule, largest measuring 1.9 cm, consistent with spondylosis.  Scant perihepatic ascites.  Atrophic pancreas with postsurgical changes/clips.  Status post cholecystectomy and splenectomy with multiple splenules noted.  Postsurgical changes of the stomach and small bowel consistent with gastric bypass.  Unchanged stranding within the mesentery and retroperitoneum.  No acute process. Labs 7/1/2022:H/H11.5/34.9, MCV 98.6, , WBC 8.6.  AST 74, ALT 27, , T bili 1.4, albumin 4, total protein 8.2, CMP otherwise unremarkable aside from glucose 153.  Negative hepatitis A antibody IgM, hepatitis B core antibody IgM, hepatitis B surface antigen, hepatitis C antibody.  Normal vitamin B12 and folate. Labs September 2021.  B12 788, CRP 5, anti-smooth muscle antibody negative, antimitochondrial antibody negative, vitamin D normal at 34, antigliadin antibody negative, antitissue transglutaminase antibody negative, glucose 426, alkaline phosphatase 219, AST 91, ALT 51, hepatitis serology negative, ferritin 504, hemoglobin 13 CT a/p wo contrast September 2021.  Prior splenectomy.  Splenosis was noted.  Gastric bypass status.  Nonobstructing kidney stone.  No acute abnormalities identified Labs August 2021.  AST 74, ALT 47, alkaline phosphatase 194 Colonoscopy 2021 [facility]. Normal per patient. The EGD was performed on 6/1/2021.  Findings included a widely patent gastrojejunostomy, a solitary jejunal erosion with adherent blood just distal of the anastomosis, mild nonerosive corpus gastritis, and normal jejunal limb status post unremarkable random biopsies.  Gastric biopsies were negative for H. pylori.  Labs 4/14/2021:H/H 10.1/33, MCV 85.3, , WBC 10.5.  Ferritin 26.1.  Normal folate and vitamin B12.  AST 67, ALT 29, , T bili 0.7, CMP otherwise unremarkable aside from glucose 124. She has a complicated medical history, and unfortunately is a fairly poor historian. Per a consult note from Dr. Bess in 2009, she was hospitalized at [facility] for abdominal pain related to acute, recurrent pancreatitis suspected to be related to familial hyperlipidemia. Her hospital course was complicated by respiratory distress syndrome requiring mechanical ventilation; prior ERCP findings raised concern for possible pancreas divisum. Due to the need for possible surgical intervention, she was transferred to [facility].  Per the patient, she was hospitalized at [facility] for 7 months, undergoing numerous surgeries. Initially, the head of the pancreas was removed, but symptoms persisted. This led to a splenectomy, and ultimately a total pancreatectomy. 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 Her initial surgeries were performed by Dr. Martin at [facility]; she has since followed with Dr. Buckner and undergone multiple hernia repairs.

## 2022-11-15 NOTE — HPI-TODAY'S VISIT:
52-year-old female with an extensive medical history to include febrile pancreatitis with SIRS and questionable pancreas divisum s/p total pancreatectomy, splenectomy, and cholecystectomy (2009, [place]. Niko Martin [place] [facility] multiple abdominal hernia repairs, presenting for follow-up regarding cirrhosis. She was seen in the office in September for follow-up after An ER visit secondary to significant abdominal pain.  A CT at that time demonstrated dilated loops of small bowel.  Her symptoms were suspected to be related to adhesions from prior surgery complicated by high-dose lactulose therapy following prior hospitalization for hepatic encephalopathy.  Her lactulose was discontinued in favor of milk of magnesia 2 tablespoons twice daily.  She was encouraged close follow-up. She was readmitted to [facility] in late October secondary to hepatic encephalopathy and elevated ammonia levels.  She was restarted on lactulose 15 mL twice daily at that time.  She remains on Xifaxan twice daily.  She is having 5-6 loose, nonbloody stools per day, occasionally resulting in accidents.  Since the time of hospital discharge, she has experienced an increase in abdominal distention and swelling.  She states she had a CT while in the hospital which showed no acute process.

## 2022-11-30 ENCOUNTER — TELEPHONE ENCOUNTER (OUTPATIENT)
Dept: URBAN - METROPOLITAN AREA CLINIC 113 | Facility: CLINIC | Age: 52
End: 2022-11-30

## 2022-11-30 ENCOUNTER — WEB ENCOUNTER (OUTPATIENT)
Dept: URBAN - METROPOLITAN AREA CLINIC 113 | Facility: CLINIC | Age: 52
End: 2022-11-30

## 2022-11-30 RX ORDER — PANTOPRAZOLE SODIUM 40 MG/1
TAKE 1 TABLET DAILY TABLET, DELAYED RELEASE ORAL TWICE DAILY
Qty: 180 | Refills: 3 | OUTPATIENT
Start: 2022-11-30

## 2022-11-30 RX ORDER — PANTOPRAZOLE SODIUM 40 MG/1
1 TABLET TABLET, DELAYED RELEASE ORAL ONCE A DAY
Qty: 90 | Refills: 3 | OUTPATIENT
Start: 2022-11-30

## 2022-11-30 RX ORDER — TRAMADOL HYDROCHLORIDE 50 MG/1
1 TABLET TABLET, FILM COATED ORAL
Qty: 60 | Refills: 1 | OUTPATIENT
Start: 2022-12-02 | End: 2023-01-31

## 2022-12-06 ENCOUNTER — OFFICE VISIT (OUTPATIENT)
Dept: URBAN - METROPOLITAN AREA CLINIC 113 | Facility: CLINIC | Age: 52
End: 2022-12-06
Payer: MEDICARE

## 2022-12-06 ENCOUNTER — LAB OUTSIDE AN ENCOUNTER (OUTPATIENT)
Dept: URBAN - METROPOLITAN AREA CLINIC 113 | Facility: CLINIC | Age: 52
End: 2022-12-06

## 2022-12-06 DIAGNOSIS — K76.9 LIVER LESION: ICD-10-CM

## 2022-12-06 DIAGNOSIS — K31.84 GASTROPARESIS: ICD-10-CM

## 2022-12-06 DIAGNOSIS — R13.14 PHARYNGOESOPHAGEAL DYSPHAGIA: ICD-10-CM

## 2022-12-06 DIAGNOSIS — K43.9 VENTRAL HERNIA: ICD-10-CM

## 2022-12-06 DIAGNOSIS — K59.01 CONSTIPATION: ICD-10-CM

## 2022-12-06 DIAGNOSIS — K74.60 CIRRHOSIS: ICD-10-CM

## 2022-12-06 DIAGNOSIS — K76.0 HEPATIC STEATOSIS: ICD-10-CM

## 2022-12-06 DIAGNOSIS — R74.8 ELEVATED LIVER ENZYMES: ICD-10-CM

## 2022-12-06 DIAGNOSIS — R18.8 OTHER ASCITES: ICD-10-CM

## 2022-12-06 DIAGNOSIS — K21.9 GERD: ICD-10-CM

## 2022-12-06 DIAGNOSIS — R10.13 EPIGASTRIC ABDOMINAL PAIN: ICD-10-CM

## 2022-12-06 PROCEDURE — 99214 OFFICE O/P EST MOD 30 MIN: CPT | Performed by: INTERNAL MEDICINE

## 2022-12-06 RX ORDER — TRAMADOL HYDROCHLORIDE 50 MG/1
1 TABLET TABLET, FILM COATED ORAL
Qty: 60 | Refills: 1 | Status: ACTIVE | COMMUNITY
Start: 2022-12-02 | End: 2023-01-31

## 2022-12-06 RX ORDER — PANCRELIPASE 36000; 180000; 114000 [USP'U]/1; [USP'U]/1; [USP'U]/1
AS DIRECTED CAPSULE, DELAYED RELEASE PELLETS ORAL
Qty: 990 | Refills: 3 | Status: ACTIVE | COMMUNITY
Start: 2022-09-08 | End: 2023-09-03

## 2022-12-06 RX ORDER — BUPROPION HYDROCHLORIDE 150 MG/1
1 TABLET IN THE MORNING TABLET, FILM COATED, EXTENDED RELEASE ORAL ONCE A DAY
Status: ACTIVE | COMMUNITY

## 2022-12-06 RX ORDER — PANTOPRAZOLE SODIUM 40 MG/1
1 TABLET TABLET, DELAYED RELEASE ORAL TWICE DAILY
Qty: 180 | Refills: 3 | Status: ON HOLD | COMMUNITY

## 2022-12-06 RX ORDER — LINACLOTIDE 290 UG/1
1 CAPSULE AT LEAST 30 MINUTES BEFORE THE FIRST MEAL OF THE DAY ON AN EMPTY STOMACH CAPSULE, GELATIN COATED ORAL ONCE A DAY
Qty: 90 | Refills: 3
End: 2023-12-01

## 2022-12-06 RX ORDER — FAMOTIDINE 40 MG/1
1 TABLET TABLET, FILM COATED ORAL
Qty: 90 | Refills: 3 | Status: ON HOLD | COMMUNITY

## 2022-12-06 RX ORDER — DICYCLOMINE HYDROCHLORIDE 20 MG/1
1 TABLET TABLET ORAL
Qty: 90 | Refills: 3 | Status: ON HOLD | COMMUNITY
Start: 2022-09-08 | End: 2023-09-03

## 2022-12-06 RX ORDER — LACTULOSE SOLUTION USP, 10 G/15 ML 10 G/15ML
10 ML SOLUTION ORAL; RECTAL
Qty: 360 ML | Refills: 5 | Status: ACTIVE | COMMUNITY
Start: 2022-08-11 | End: 2023-02-06

## 2022-12-06 RX ORDER — FAMOTIDINE 40 MG/1
1 TABLET AT BEDTIME TABLET, FILM COATED ORAL ONCE A DAY
Qty: 90 | Refills: 3
Start: 2022-09-08

## 2022-12-06 RX ORDER — MULTIVIT-MIN/FA/LYCOPEN/LUTEIN .4-300-25
AS DIRECTED TABLET ORAL
Status: ACTIVE | COMMUNITY

## 2022-12-06 RX ORDER — LINACLOTIDE 290 UG/1
1 CAPSULE AT LEAST 30 MINUTES BEFORE THE FIRST MEAL OF THE DAY ON AN EMPTY STOMACH CAPSULE, GELATIN COATED ORAL ONCE A DAY
Qty: 90 | Refills: 3 | Status: ON HOLD | COMMUNITY
Start: 2022-09-08 | End: 2023-09-03

## 2022-12-06 RX ORDER — HUMAN INSULIN 100 [IU]/ML
AS DIRECTED INJECTION, SUSPENSION SUBCUTANEOUS
Status: ACTIVE | COMMUNITY

## 2022-12-06 RX ORDER — DICYCLOMINE HYDROCHLORIDE 20 MG/1
1 TABLET TABLET ORAL
Qty: 100 | Refills: 3
End: 2023-12-01

## 2022-12-06 RX ORDER — FENOFIBRATE 200 MG/1
1 CAPSULE WITH A MEAL CAPSULE ORAL ONCE A DAY
Status: ACTIVE | COMMUNITY

## 2022-12-06 RX ORDER — PANTOPRAZOLE SODIUM 40 MG/1
TAKE 1 TABLET DAILY TABLET, DELAYED RELEASE ORAL TWICE DAILY
Qty: 180 | Refills: 3 | Status: ACTIVE | COMMUNITY
Start: 2022-11-30

## 2022-12-06 RX ORDER — INSULIN DEGLUDEC INJECTION 200 U/ML
AS DIRECTED INJECTION, SOLUTION SUBCUTANEOUS
Status: ACTIVE | COMMUNITY

## 2022-12-06 RX ORDER — PANTOPRAZOLE SODIUM 40 MG/1
TAKE 1 TABLET DAILY TABLET, DELAYED RELEASE ORAL TWICE DAILY
Qty: 180 | Refills: 3
Start: 2022-09-08

## 2022-12-06 RX ORDER — ONDANSETRON 8 MG/1
DISSOLVE 1 TABLET ON THE  TONGUE 3 TIMES DAILY AS  NEEDED FOR NAUSEA TABLET, ORALLY DISINTEGRATING ORAL
Qty: 145 | Refills: 3

## 2022-12-06 RX ORDER — PANTOPRAZOLE SODIUM 40 MG/1
TAKE 1 TABLET DAILY TABLET, DELAYED RELEASE ORAL TWICE DAILY
Qty: 180 | Refills: 3 | Status: ACTIVE | COMMUNITY
Start: 2022-09-08

## 2022-12-06 RX ORDER — LINACLOTIDE 290 UG/1
1 CAPSULE AT LEAST 30 MINUTES BEFORE THE FIRST MEAL OF THE DAY ON AN EMPTY STOMACH CAPSULE, GELATIN COATED ORAL ONCE A DAY
Qty: 90 | Refills: 3 | Status: ON HOLD | COMMUNITY
End: 2023-09-01

## 2022-12-06 RX ORDER — HYDROCHLOROTHIAZIDE 12.5 MG/1
1 TABLET IN THE MORNING TABLET ORAL ONCE A DAY
Status: ACTIVE | COMMUNITY

## 2022-12-06 RX ORDER — LACTULOSE 10 G/15ML
45 ML SOLUTION ORAL
Qty: 12150 ML | Refills: 3
End: 2023-12-01

## 2022-12-06 RX ORDER — METOPROLOL SUCCINATE 50 MG/1
1 TABLET TABLET, FILM COATED, EXTENDED RELEASE ORAL ONCE A DAY
Status: ACTIVE | COMMUNITY

## 2022-12-06 RX ORDER — CITALOPRAM HYDROBROMIDE 40 MG/1
0.5 TABLET TABLET, FILM COATED ORAL ONCE A DAY
Status: ACTIVE | COMMUNITY

## 2022-12-06 RX ORDER — RIFAXIMIN 550 MG/1
1 TABLET TABLET ORAL TWICE A DAY
Qty: 60 | Refills: 11

## 2022-12-06 RX ORDER — LACTULOSE 10 G/15ML
45 ML SOLUTION ORAL
Qty: 12150 ML | Refills: 3 | Status: ACTIVE | COMMUNITY

## 2022-12-06 RX ORDER — RIFAXIMIN 550 MG/1
TAKE 1 TABLET  BY MOUTH IN THE MORNING AND 1 TABLET BEFORE BEDTIME. DO ALL THIS FOR 30 DAYS TABLET ORAL
Qty: 60 TABLET | Refills: 11 | Status: ACTIVE | COMMUNITY

## 2022-12-06 RX ORDER — ONDANSETRON 8 MG/1
DISSOLVE 1 TABLET ON THE  TONGUE 3 TIMES DAILY AS  NEEDED FOR NAUSEA TABLET, ORALLY DISINTEGRATING ORAL
Qty: 270 TABLET | Refills: 0 | Status: ACTIVE | COMMUNITY

## 2022-12-06 RX ORDER — PANCRELIPASE 36000; 180000; 114000 [USP'U]/1; [USP'U]/1; [USP'U]/1
TWO TABLETS CAPSULE, DELAYED RELEASE PELLETS ORAL
Qty: 720 | Refills: 3

## 2022-12-06 RX ORDER — ROSUVASTATIN CALCIUM 5 MG/1
1 TABLET TABLET, FILM COATED ORAL ONCE A DAY
Status: ACTIVE | COMMUNITY

## 2022-12-06 RX ORDER — FAMOTIDINE 40 MG/1
1 TABLET AT BEDTIME TABLET, FILM COATED ORAL ONCE A DAY
Qty: 90 | Refills: 3 | Status: ACTIVE | COMMUNITY
Start: 2022-09-08

## 2022-12-06 RX ORDER — MIRABEGRON 25 MG/1
1 TABLET TABLET, FILM COATED, EXTENDED RELEASE ORAL ONCE A DAY
Status: ACTIVE | COMMUNITY

## 2022-12-06 RX ORDER — PANCRELIPASE 36000; 180000; 114000 [USP'U]/1; [USP'U]/1; [USP'U]/1
TWO TABLETS CAPSULE, DELAYED RELEASE PELLETS ORAL
Qty: 540 | Refills: 3 | Status: ACTIVE | COMMUNITY

## 2022-12-06 RX ORDER — PANTOPRAZOLE SODIUM 40 MG/1
1 TABLET TABLET, DELAYED RELEASE ORAL ONCE A DAY
Qty: 90 | Refills: 3 | Status: ACTIVE | COMMUNITY
Start: 2022-11-30

## 2022-12-06 RX ORDER — TELMISARTAN 40 MG/1
1 TABLET TABLET ORAL ONCE A DAY
Status: ACTIVE | COMMUNITY

## 2022-12-06 RX ORDER — DICYCLOMINE HYDROCHLORIDE 20 MG/1
1 TABLET TABLET ORAL
Qty: 100 | Refills: 3 | Status: ON HOLD | COMMUNITY
End: 2023-09-01

## 2022-12-06 RX ORDER — PROMETHAZINE HYDROCHLORIDE 12.5 MG/1
1 TABLET AS NEEDED TABLET ORAL
Status: ACTIVE | COMMUNITY

## 2022-12-06 NOTE — HPI-TODAY'S VISIT:
52-year-old female with an extensive medical history to include febrile pancreatitis with SIRS and questionable pancreas divisum s/p total pancreatectomy, splenectomy, and cholecystectomy (2009, Dr. Niko Martin at [facility]), multiple abdominal hernia repairs, presenting for follow-up regarding cirrhosis.

## 2022-12-06 NOTE — HPI-OTHER HISTORIES
MRI of the abdomen with and without contrast 7/24/2022 showed cirrhosis with a small amount of ascites and intrahepatic lesions consistent with splenosis. . Abdominal ultrasound 7/22/2022 showed cirrhotic hepatic morphology with a 1.7 cm hypoechoic mass in the left hepatic lobe . CT of the abdomen and pelvis without contrast 7/21/2022 showed multiple stable hyperattenuating lesions along the liver capsule, largest measuring 1.9 cm, consistent with spondylosis.  Scant perihepatic ascites.  Atrophic pancreas with postsurgical changes/clips.  Status post cholecystectomy and splenectomy with multiple splenules noted.  Postsurgical changes of the stomach and small bowel consistent with gastric bypass.  Unchanged stranding within the mesentery and retroperitoneum.  No acute process. . Labs 7/1/2022:H/H11.5/34.9, MCV 98.6, , WBC 8.6.  AST 74, ALT 27, , T bili 1.4, albumin 4, total protein 8.2, CMP otherwise unremarkable aside from glucose 153.  Negative hepatitis A antibody IgM, hepatitis B core antibody IgM, hepatitis B surface antigen, hepatitis C antibody.  Normal vitamin B12 and folate. . Labs September 2021.  B12 788, CRP 5, anti-smooth muscle antibody negative, antimitochondrial antibody negative, vitamin D normal at 34, antigliadin antibody negative, antitissue transglutaminase antibody negative, glucose 426, alkaline phosphatase 219, AST 91, ALT 51, hepatitis serology negative, ferritin 504, hemoglobin 13 . CT a/p wo contrast September 2021.  Prior splenectomy.  Splenosis was noted.  Gastric bypass status.  Nonobstructing kidney stone.  No acute abnormalities identified . Labs August 2021.  AST 74, ALT 47, alkaline phosphatase 194 . Colonoscopy 2021 [facility]. Normal per patient. . EGD  6/1/2021.  Findings included a widely patent gastrojejunostomy, a solitary jejunal erosion with adherent blood just distal of the anastomosis, mild nonerosive corpus gastritis, and normal jejunal limb status post unremarkable random biopsies.  Gastric biopsies were negative for H. pylori.  . Labs 4/14/2021:H/H 10.1/33, MCV 85.3, , WBC 10.5.  Ferritin 26.1.  Normal folate and vitamin B12.  AST 67, ALT 29, , T bili 0.7, CMP otherwise unremarkable aside from glucose 124. . She has a complicated medical history, and unfortunately is a fairly poor historian. Per a consult note from Dr. Bess in 2009, she was hospitalized at [facility] for abdominal pain related to acute, recurrent pancreatitis suspected to be related to familial hyperlipidemia. Her hospital course was complicated by respiratory distress syndrome requiring mechanical ventilation; prior ERCP findings raised concern for possible pancreas divisum. Due to the need for possible surgical intervention, she was transferred to [facility].  . Per the patient, she was hospitalized at [facility] for 7 months, undergoing numerous surgeries. Initially, the head of the pancreas was removed, but symptoms persisted. This led to a splenectomy, and ultimately a total pancreatectomy. 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 Her initial surgeries were performed by Dr. Martin at [facility]; she has since followed with Dr. Buckner and undergone multiple hernia repairs.

## 2022-12-06 NOTE — HPI-TODAY'S VISIT:
53 yo F with cirrhosis due to NAFLD returns for office follow up.  . She continues to have difficulty with diffuse chronic abdominal pain.  Her encephalopathy is somewhat better.  She is compliant with lactulose and Xifaxan.  She is accompanied today by her family members.  She is not able to try.  There has been no report of hematemesis.  She does have chronic nausea requiring Zofran therapy.  She is compliant with famotidine and pantoprazole therapy. . Abdominal ultrasound November 2022.  Negative for significant ascites.  Consistent with cirrhosis. . She was seen in the office in September for follow-up after an ER visit secondary to significant abdominal pain.  A CT at that time demonstrated dilated loops of small bowel.  Her symptoms were suspected to be related to adhesions from prior surgery complicated by high-dose lactulose therapy following prior hospitalization for hepatic encephalopathy.  Her lactulose was discontinued in favor of milk of magnesia 2 tablespoons twice daily.  She was encouraged close follow-up. . She was readmitted to [facility] in late October secondary to hepatic encephalopathy and elevated ammonia levels.  She was restarted on lactulose 15 mL twice daily at that time.  She remains on Xifaxan twice daily.  She is having 5-6 loose, nonbloody stools per day, occasionally resulting in accidents.  Since the time of hospital discharge, she has experienced an increase in abdominal distention and swelling.  She states she had a CT while in the hospital which showed no acute process.

## 2022-12-09 ENCOUNTER — TELEPHONE ENCOUNTER (OUTPATIENT)
Dept: URBAN - METROPOLITAN AREA CLINIC 113 | Facility: CLINIC | Age: 52
End: 2022-12-09

## 2022-12-09 RX ORDER — RIFAXIMIN 550 MG/1
1 TABLET TABLET ORAL TWICE A DAY
Qty: 180 | Refills: 3

## 2022-12-15 ENCOUNTER — TELEPHONE ENCOUNTER (OUTPATIENT)
Dept: URBAN - METROPOLITAN AREA CLINIC 113 | Facility: CLINIC | Age: 52
End: 2022-12-15

## 2022-12-15 ENCOUNTER — LAB OUTSIDE AN ENCOUNTER (OUTPATIENT)
Dept: URBAN - METROPOLITAN AREA CLINIC 113 | Facility: CLINIC | Age: 52
End: 2022-12-15

## 2022-12-15 ENCOUNTER — OUT OF OFFICE VISIT (OUTPATIENT)
Dept: URBAN - METROPOLITAN AREA MEDICAL CENTER 2 | Facility: MEDICAL CENTER | Age: 52
End: 2022-12-15
Payer: MEDICARE

## 2022-12-15 DIAGNOSIS — R93.3 ABN FINDINGS-GI TRACT: ICD-10-CM

## 2022-12-15 DIAGNOSIS — K59.39 DILATATION OF COLON: ICD-10-CM

## 2022-12-15 DIAGNOSIS — K74.69 CIRRHOSIS, CRYPTOGENIC: ICD-10-CM

## 2022-12-15 DIAGNOSIS — R14.0 ABDOMINAL BLOATING: ICD-10-CM

## 2022-12-15 DIAGNOSIS — Z98.890 H/O ABDOMINAL SURGERY: ICD-10-CM

## 2022-12-15 DIAGNOSIS — R11.2 ACUTE NAUSEA WITH NONBILIOUS VOMITING: ICD-10-CM

## 2022-12-15 DIAGNOSIS — K56.699 OTHER INTESTINAL OBSTRUCTION UNSPECIFIED AS TO PARTIAL VERSUS COMPLETE OBSTRUCTION: ICD-10-CM

## 2022-12-15 DIAGNOSIS — R10.817 ABDOMINAL TENDERNESS, GENERALIZED: ICD-10-CM

## 2022-12-15 DIAGNOSIS — K64.1 BLEEDING GRADE II HEMORRHOIDS: ICD-10-CM

## 2022-12-15 DIAGNOSIS — K59.39 OTHER MEGACOLON: ICD-10-CM

## 2022-12-15 DIAGNOSIS — K63.3 APHTHOUS ULCER OF COLON: ICD-10-CM

## 2022-12-15 PROBLEM — 389026000 ASCITES: Status: ACTIVE | Noted: 2022-11-15

## 2022-12-15 PROCEDURE — 99223 1ST HOSP IP/OBS HIGH 75: CPT | Performed by: PHYSICIAN ASSISTANT

## 2022-12-15 PROCEDURE — 45378 DIAGNOSTIC COLONOSCOPY: CPT | Performed by: PHYSICIAN ASSISTANT

## 2022-12-15 PROCEDURE — 45378 DIAGNOSTIC COLONOSCOPY: CPT | Performed by: INTERNAL MEDICINE

## 2022-12-16 ENCOUNTER — CLAIMS CREATED FROM THE CLAIM WINDOW (OUTPATIENT)
Dept: URBAN - METROPOLITAN AREA MEDICAL CENTER 2 | Facility: MEDICAL CENTER | Age: 52
End: 2022-12-16
Payer: MEDICARE

## 2022-12-16 DIAGNOSIS — K59.39 OTHER MEGACOLON: ICD-10-CM

## 2022-12-16 DIAGNOSIS — R14.0 ABDOMINAL BLOATING: ICD-10-CM

## 2022-12-16 DIAGNOSIS — Z98.890 H/O ABDOMINAL SURGERY: ICD-10-CM

## 2022-12-16 DIAGNOSIS — K63.3 APHTHOUS ULCER OF COLON: ICD-10-CM

## 2022-12-16 DIAGNOSIS — K74.69 CIRRHOSIS, CRYPTOGENIC: ICD-10-CM

## 2022-12-16 DIAGNOSIS — K56.699 OTHER INTESTINAL OBSTRUCTION UNSPECIFIED AS TO PARTIAL VERSUS COMPLETE OBSTRUCTION: ICD-10-CM

## 2022-12-16 PROCEDURE — 99233 SBSQ HOSP IP/OBS HIGH 50: CPT | Performed by: PHYSICIAN ASSISTANT

## 2022-12-16 PROCEDURE — 99232 SBSQ HOSP IP/OBS MODERATE 35: CPT | Performed by: PHYSICIAN ASSISTANT

## 2022-12-30 PROBLEM — 266468003 CIRRHOSIS - NON-ALCOHOLIC: Status: ACTIVE | Noted: 2022-12-30

## 2023-01-04 ENCOUNTER — LAB OUTSIDE AN ENCOUNTER (OUTPATIENT)
Dept: URBAN - METROPOLITAN AREA CLINIC 113 | Facility: CLINIC | Age: 53
End: 2023-01-04

## 2023-01-04 ENCOUNTER — TELEPHONE ENCOUNTER (OUTPATIENT)
Dept: URBAN - METROPOLITAN AREA CLINIC 113 | Facility: CLINIC | Age: 53
End: 2023-01-04

## 2023-01-04 PROBLEM — 79922009 EPIGASTRIC PAIN: Status: ACTIVE | Noted: 2021-06-01

## 2023-01-11 ENCOUNTER — TELEPHONE ENCOUNTER (OUTPATIENT)
Dept: URBAN - METROPOLITAN AREA CLINIC 113 | Facility: CLINIC | Age: 53
End: 2023-01-11

## 2023-01-12 ENCOUNTER — TELEPHONE ENCOUNTER (OUTPATIENT)
Dept: URBAN - METROPOLITAN AREA CLINIC 113 | Facility: CLINIC | Age: 53
End: 2023-01-12

## 2023-01-13 ENCOUNTER — TELEPHONE ENCOUNTER (OUTPATIENT)
Dept: URBAN - METROPOLITAN AREA CLINIC 113 | Facility: CLINIC | Age: 53
End: 2023-01-13

## 2023-01-13 PROBLEM — 255417007 CIRRHOTIC: Status: ACTIVE | Noted: 2022-08-18

## 2023-01-25 ENCOUNTER — TELEPHONE ENCOUNTER (OUTPATIENT)
Dept: URBAN - METROPOLITAN AREA CLINIC 113 | Facility: CLINIC | Age: 53
End: 2023-01-25

## 2023-01-25 RX ORDER — ONDANSETRON 8 MG/1
DISSOLVE 1 TABLET ON THE  TONGUE 3 TIMES DAILY AS  NEEDED FOR NAUSEA TABLET, ORALLY DISINTEGRATING ORAL
Qty: 145 | Refills: 3

## 2023-01-25 RX ORDER — LINACLOTIDE 290 UG/1
1 CAPSULE AT LEAST 30 MINUTES BEFORE THE FIRST MEAL OF THE DAY ON AN EMPTY STOMACH CAPSULE, GELATIN COATED ORAL ONCE A DAY
Qty: 90 | Refills: 3
End: 2024-01-20

## 2023-01-25 RX ORDER — PANTOPRAZOLE SODIUM 40 MG/1
TAKE 1 TABLET DAILY TABLET, DELAYED RELEASE ORAL TWICE DAILY
Qty: 180 | Refills: 3
Start: 2022-09-08

## 2023-01-25 RX ORDER — LACTULOSE 10 G/15ML
45 ML SOLUTION ORAL
Qty: 12150 ML | Refills: 3
End: 2024-01-20

## 2023-01-25 RX ORDER — PANCRELIPASE 36000; 180000; 114000 [USP'U]/1; [USP'U]/1; [USP'U]/1
TWO TABLETS CAPSULE, DELAYED RELEASE PELLETS ORAL
Qty: 720 | Refills: 3
End: 2024-01-20

## 2023-01-25 RX ORDER — RIFAXIMIN 550 MG/1
1 TABLET TABLET ORAL TWICE A DAY
Qty: 180 | Refills: 3
End: 2024-01-20

## 2023-01-25 RX ORDER — DICYCLOMINE HYDROCHLORIDE 20 MG/1
1 TABLET TABLET ORAL
Qty: 100 | Refills: 3
End: 2024-01-20

## 2023-02-13 ENCOUNTER — TELEPHONE ENCOUNTER (OUTPATIENT)
Dept: URBAN - METROPOLITAN AREA CLINIC 113 | Facility: CLINIC | Age: 53
End: 2023-02-13

## 2023-02-14 PROBLEM — 54768000 BLADDER DISTENTION: Status: ACTIVE | Noted: 2023-01-13

## 2023-02-15 ENCOUNTER — WEB ENCOUNTER (OUTPATIENT)
Dept: URBAN - METROPOLITAN AREA CLINIC 113 | Facility: CLINIC | Age: 53
End: 2023-02-15

## 2023-02-15 RX ORDER — RIFAXIMIN 550 MG/1
1 TABLET TABLET ORAL TWICE A DAY
Qty: 180 | Refills: 3
End: 2024-02-10

## 2023-02-21 ENCOUNTER — ERX REFILL RESPONSE (OUTPATIENT)
Dept: URBAN - METROPOLITAN AREA CLINIC 113 | Facility: CLINIC | Age: 53
End: 2023-02-21

## 2023-02-21 ENCOUNTER — OFFICE VISIT (OUTPATIENT)
Dept: URBAN - METROPOLITAN AREA SURGERY CENTER 25 | Facility: SURGERY CENTER | Age: 53
End: 2023-02-21
Payer: MEDICARE

## 2023-02-21 DIAGNOSIS — R93.3 ABN FINDINGS-GI TRACT: ICD-10-CM

## 2023-02-21 PROCEDURE — 45380 COLONOSCOPY AND BIOPSY: CPT | Performed by: INTERNAL MEDICINE

## 2023-02-21 PROCEDURE — G8907 PT DOC NO EVENTS ON DISCHARG: HCPCS | Performed by: INTERNAL MEDICINE

## 2023-02-21 RX ORDER — BUPROPION HYDROCHLORIDE 150 MG/1
1 TABLET IN THE MORNING TABLET, FILM COATED, EXTENDED RELEASE ORAL ONCE A DAY
Status: ACTIVE | COMMUNITY

## 2023-02-21 RX ORDER — FAMOTIDINE 40 MG/1
1 TABLET TABLET, FILM COATED ORAL
Qty: 90 | Refills: 3 | Status: ON HOLD | COMMUNITY

## 2023-02-21 RX ORDER — RIFAXIMIN 550 MG/1
1 TABLET TABLET ORAL TWICE A DAY
Qty: 180 | Refills: 3 | Status: ACTIVE | COMMUNITY
End: 2024-02-10

## 2023-02-21 RX ORDER — HYDROCHLOROTHIAZIDE 12.5 MG/1
1 TABLET IN THE MORNING TABLET ORAL ONCE A DAY
Status: ACTIVE | COMMUNITY

## 2023-02-21 RX ORDER — LACTULOSE 10 G/15ML
45 ML SOLUTION ORAL
Qty: 12150 ML | Refills: 3 | Status: ACTIVE | COMMUNITY
End: 2024-01-20

## 2023-02-21 RX ORDER — PANCRELIPASE 36000; 180000; 114000 [USP'U]/1; [USP'U]/1; [USP'U]/1
TWO TABLETS CAPSULE, DELAYED RELEASE PELLETS ORAL
Qty: 720 | Refills: 3 | Status: ACTIVE | COMMUNITY
End: 2024-01-20

## 2023-02-21 RX ORDER — FAMOTIDINE 40 MG/1
1 TABLET AT BEDTIME TABLET, FILM COATED ORAL ONCE A DAY
Qty: 90 | Refills: 3 | Status: ACTIVE | COMMUNITY
Start: 2022-09-08

## 2023-02-21 RX ORDER — PROMETHAZINE HYDROCHLORIDE 12.5 MG/1
1 TABLET AS NEEDED TABLET ORAL
Status: ACTIVE | COMMUNITY

## 2023-02-21 RX ORDER — PANCRELIPASE 36000; 180000; 114000 [USP'U]/1; [USP'U]/1; [USP'U]/1
TWO TABLETS CAPSULE, DELAYED RELEASE PELLETS ORAL
Qty: 540 | Refills: 3 | Status: ACTIVE | COMMUNITY

## 2023-02-21 RX ORDER — ROSUVASTATIN CALCIUM 5 MG/1
1 TABLET TABLET, FILM COATED ORAL ONCE A DAY
Status: ACTIVE | COMMUNITY

## 2023-02-21 RX ORDER — ONDANSETRON 8 MG/1
DISSOLVE 1 TABLET ON THE  TONGUE 3 TIMES DAILY AS  NEEDED FOR NAUSEA TABLET, ORALLY DISINTEGRATING ORAL
Qty: 145 | Refills: 3 | Status: ACTIVE | COMMUNITY

## 2023-02-21 RX ORDER — LINACLOTIDE 290 UG/1
1 CAPSULE AT LEAST 30 MINUTES BEFORE THE FIRST MEAL OF THE DAY ON AN EMPTY STOMACH CAPSULE, GELATIN COATED ORAL ONCE A DAY
Qty: 90 | Refills: 3 | Status: ACTIVE | COMMUNITY
End: 2024-01-20

## 2023-02-21 RX ORDER — PANTOPRAZOLE SODIUM 40 MG/1
1 TABLET TABLET, DELAYED RELEASE ORAL TWICE DAILY
Qty: 180 | Refills: 3 | Status: ON HOLD | COMMUNITY

## 2023-02-21 RX ORDER — FENOFIBRATE 200 MG/1
1 CAPSULE WITH A MEAL CAPSULE ORAL ONCE A DAY
Status: ACTIVE | COMMUNITY

## 2023-02-21 RX ORDER — PANTOPRAZOLE SODIUM 40 MG/1
TAKE 1 TABLET DAILY TABLET, DELAYED RELEASE ORAL TWICE DAILY
Qty: 180 | Refills: 3 | Status: ACTIVE | COMMUNITY
Start: 2022-11-30

## 2023-02-21 RX ORDER — TELMISARTAN 40 MG/1
1 TABLET TABLET ORAL ONCE A DAY
Status: ACTIVE | COMMUNITY

## 2023-02-21 RX ORDER — FAMOTIDINE 40 MG/1
TAKE ONE TABLET BY MOUTH AT BEDTIME TABLET, FILM COATED ORAL
Qty: 90 TABLET | Refills: 3 | OUTPATIENT

## 2023-02-21 RX ORDER — DICYCLOMINE HYDROCHLORIDE 20 MG/1
1 TABLET TABLET ORAL
Qty: 90 | Refills: 3 | Status: ON HOLD | COMMUNITY
Start: 2022-09-08 | End: 2023-09-03

## 2023-02-21 RX ORDER — CITALOPRAM HYDROBROMIDE 40 MG/1
0.5 TABLET TABLET, FILM COATED ORAL ONCE A DAY
Status: ACTIVE | COMMUNITY

## 2023-02-21 RX ORDER — MULTIVIT-MIN/FA/LYCOPEN/LUTEIN .4-300-25
AS DIRECTED TABLET ORAL
Status: ACTIVE | COMMUNITY

## 2023-02-21 RX ORDER — LINACLOTIDE 290 UG/1
1 CAPSULE AT LEAST 30 MINUTES BEFORE THE FIRST MEAL OF THE DAY ON AN EMPTY STOMACH CAPSULE, GELATIN COATED ORAL ONCE A DAY
Qty: 90 | Refills: 3 | Status: ON HOLD | COMMUNITY
Start: 2022-09-08 | End: 2023-09-03

## 2023-02-21 RX ORDER — HUMAN INSULIN 100 [IU]/ML
AS DIRECTED INJECTION, SUSPENSION SUBCUTANEOUS
Status: ACTIVE | COMMUNITY

## 2023-02-21 RX ORDER — METOPROLOL SUCCINATE 50 MG/1
1 TABLET TABLET, FILM COATED, EXTENDED RELEASE ORAL ONCE A DAY
Status: ACTIVE | COMMUNITY

## 2023-02-21 RX ORDER — DICYCLOMINE HYDROCHLORIDE 20 MG/1
1 TABLET TABLET ORAL
Qty: 100 | Refills: 3 | Status: ACTIVE | COMMUNITY
End: 2024-01-20

## 2023-02-21 RX ORDER — PANTOPRAZOLE SODIUM 40 MG/1
1 TABLET TABLET, DELAYED RELEASE ORAL ONCE A DAY
Qty: 90 | Refills: 3 | Status: ACTIVE | COMMUNITY
Start: 2022-11-30

## 2023-02-21 RX ORDER — MIRABEGRON 25 MG/1
1 TABLET TABLET, FILM COATED, EXTENDED RELEASE ORAL ONCE A DAY
Status: ACTIVE | COMMUNITY

## 2023-02-21 RX ORDER — INSULIN DEGLUDEC INJECTION 200 U/ML
AS DIRECTED INJECTION, SOLUTION SUBCUTANEOUS
Status: ACTIVE | COMMUNITY

## 2023-02-21 RX ORDER — FAMOTIDINE 40 MG/1
1 TABLET TABLET, FILM COATED ORAL
Qty: 90 | Refills: 3 | OUTPATIENT

## 2023-02-21 RX ORDER — PANTOPRAZOLE SODIUM 40 MG/1
TAKE 1 TABLET DAILY TABLET, DELAYED RELEASE ORAL TWICE DAILY
Qty: 180 | Refills: 3 | Status: ACTIVE | COMMUNITY
Start: 2022-09-08

## 2023-03-05 ENCOUNTER — WEB ENCOUNTER (OUTPATIENT)
Dept: URBAN - METROPOLITAN AREA CLINIC 113 | Facility: CLINIC | Age: 53
End: 2023-03-05

## 2023-03-13 ENCOUNTER — OFFICE VISIT (OUTPATIENT)
Dept: URBAN - METROPOLITAN AREA CLINIC 113 | Facility: CLINIC | Age: 53
End: 2023-03-13
Payer: MEDICARE

## 2023-03-13 VITALS
TEMPERATURE: 97.5 F | SYSTOLIC BLOOD PRESSURE: 179 MMHG | RESPIRATION RATE: 20 BRPM | DIASTOLIC BLOOD PRESSURE: 80 MMHG | HEIGHT: 62 IN | HEART RATE: 80 BPM | WEIGHT: 160.2 LBS | BODY MASS INDEX: 29.48 KG/M2

## 2023-03-13 DIAGNOSIS — R18.8 OTHER ASCITES: ICD-10-CM

## 2023-03-13 DIAGNOSIS — R13.14 PHARYNGOESOPHAGEAL DYSPHAGIA: ICD-10-CM

## 2023-03-13 DIAGNOSIS — K59.01 CONSTIPATION: ICD-10-CM

## 2023-03-13 DIAGNOSIS — K63.89 COLON DISTENTION: ICD-10-CM

## 2023-03-13 DIAGNOSIS — K21.9 GERD: ICD-10-CM

## 2023-03-13 DIAGNOSIS — R10.13 EPIGASTRIC ABDOMINAL PAIN: ICD-10-CM

## 2023-03-13 DIAGNOSIS — K31.84 GASTROPARESIS: ICD-10-CM

## 2023-03-13 DIAGNOSIS — K74.60 CIRRHOSIS: ICD-10-CM

## 2023-03-13 DIAGNOSIS — K76.0 HEPATIC STEATOSIS: ICD-10-CM

## 2023-03-13 DIAGNOSIS — R74.8 ELEVATED LIVER ENZYMES: ICD-10-CM

## 2023-03-13 PROCEDURE — 99214 OFFICE O/P EST MOD 30 MIN: CPT | Performed by: INTERNAL MEDICINE

## 2023-03-13 RX ORDER — DICYCLOMINE HYDROCHLORIDE 20 MG/1
1 TABLET TABLET ORAL
Qty: 100 | Refills: 3 | Status: ACTIVE | COMMUNITY
End: 2024-01-20

## 2023-03-13 RX ORDER — LACTULOSE 10 G/15ML
45 ML SOLUTION ORAL
Qty: 12150 ML | Refills: 3 | Status: ACTIVE | COMMUNITY
End: 2024-01-20

## 2023-03-13 RX ORDER — INSULIN DEGLUDEC INJECTION 200 U/ML
AS DIRECTED INJECTION, SOLUTION SUBCUTANEOUS
Status: ACTIVE | COMMUNITY

## 2023-03-13 RX ORDER — MIRABEGRON 25 MG/1
1 TABLET TABLET, FILM COATED, EXTENDED RELEASE ORAL ONCE A DAY
Status: ACTIVE | COMMUNITY

## 2023-03-13 RX ORDER — HUMAN INSULIN 100 [IU]/ML
AS DIRECTED INJECTION, SUSPENSION SUBCUTANEOUS
Status: ACTIVE | COMMUNITY

## 2023-03-13 RX ORDER — PANTOPRAZOLE SODIUM 40 MG/1
TAKE 1 TABLET DAILY TABLET, DELAYED RELEASE ORAL TWICE DAILY
Qty: 180 | Refills: 3 | Status: ACTIVE | COMMUNITY
Start: 2022-11-30

## 2023-03-13 RX ORDER — PROMETHAZINE HYDROCHLORIDE 12.5 MG/1
1 TABLET AS NEEDED TABLET ORAL
Status: ACTIVE | COMMUNITY

## 2023-03-13 RX ORDER — FENOFIBRATE 200 MG/1
1 CAPSULE WITH A MEAL CAPSULE ORAL ONCE A DAY
Status: ACTIVE | COMMUNITY

## 2023-03-13 RX ORDER — ROSUVASTATIN CALCIUM 5 MG/1
1 TABLET TABLET, FILM COATED ORAL ONCE A DAY
Status: ACTIVE | COMMUNITY

## 2023-03-13 RX ORDER — PANTOPRAZOLE SODIUM 40 MG/1
1 TABLET TABLET, DELAYED RELEASE ORAL ONCE A DAY
Qty: 90 | Refills: 3 | Status: ACTIVE | COMMUNITY
Start: 2022-11-30

## 2023-03-13 RX ORDER — DICYCLOMINE HYDROCHLORIDE 20 MG/1
1 TABLET TABLET ORAL
Qty: 90 | Refills: 3 | Status: ON HOLD | COMMUNITY
Start: 2022-09-08 | End: 2023-09-03

## 2023-03-13 RX ORDER — PANTOPRAZOLE SODIUM 40 MG/1
1 TABLET TABLET, DELAYED RELEASE ORAL TWICE DAILY
Qty: 180 | Refills: 3 | Status: ON HOLD | COMMUNITY

## 2023-03-13 RX ORDER — HYDROCHLOROTHIAZIDE 12.5 MG/1
1 TABLET IN THE MORNING TABLET ORAL ONCE A DAY
Status: ACTIVE | COMMUNITY

## 2023-03-13 RX ORDER — ONDANSETRON 8 MG/1
DISSOLVE 1 TABLET ON THE  TONGUE 3 TIMES DAILY AS  NEEDED FOR NAUSEA TABLET, ORALLY DISINTEGRATING ORAL
Qty: 145 | Refills: 3 | Status: ACTIVE | COMMUNITY

## 2023-03-13 RX ORDER — LINACLOTIDE 290 UG/1
1 CAPSULE AT LEAST 30 MINUTES BEFORE THE FIRST MEAL OF THE DAY ON AN EMPTY STOMACH CAPSULE, GELATIN COATED ORAL ONCE A DAY
Qty: 90 | Refills: 3 | Status: ON HOLD | COMMUNITY
Start: 2022-09-08 | End: 2023-09-03

## 2023-03-13 RX ORDER — MULTIVIT-MIN/FA/LYCOPEN/LUTEIN .4-300-25
AS DIRECTED TABLET ORAL
Status: ACTIVE | COMMUNITY

## 2023-03-13 RX ORDER — LINACLOTIDE 290 UG/1
1 CAPSULE AT LEAST 30 MINUTES BEFORE THE FIRST MEAL OF THE DAY ON AN EMPTY STOMACH CAPSULE, GELATIN COATED ORAL ONCE A DAY
Qty: 90 | Refills: 3 | Status: ACTIVE | COMMUNITY
End: 2024-01-20

## 2023-03-13 RX ORDER — TELMISARTAN 40 MG/1
1 TABLET TABLET ORAL ONCE A DAY
Status: ACTIVE | COMMUNITY

## 2023-03-13 RX ORDER — RIFAXIMIN 550 MG/1
1 TABLET TABLET ORAL TWICE A DAY
Qty: 180 | Refills: 3 | Status: ACTIVE | COMMUNITY
End: 2024-02-10

## 2023-03-13 RX ORDER — PANCRELIPASE 36000; 180000; 114000 [USP'U]/1; [USP'U]/1; [USP'U]/1
TWO TABLETS CAPSULE, DELAYED RELEASE PELLETS ORAL
Qty: 540 | Refills: 3 | Status: ACTIVE | COMMUNITY

## 2023-03-13 RX ORDER — BUPROPION HYDROCHLORIDE 150 MG/1
1 TABLET IN THE MORNING TABLET, FILM COATED, EXTENDED RELEASE ORAL ONCE A DAY
Status: ACTIVE | COMMUNITY

## 2023-03-13 RX ORDER — PANTOPRAZOLE SODIUM 40 MG/1
TAKE 1 TABLET DAILY TABLET, DELAYED RELEASE ORAL TWICE DAILY
Qty: 180 | Refills: 3 | Status: ACTIVE | COMMUNITY
Start: 2022-09-08

## 2023-03-13 RX ORDER — PANCRELIPASE 36000; 180000; 114000 [USP'U]/1; [USP'U]/1; [USP'U]/1
TWO TABLETS CAPSULE, DELAYED RELEASE PELLETS ORAL
Qty: 720 | Refills: 3 | Status: ACTIVE | COMMUNITY
End: 2024-01-20

## 2023-03-13 RX ORDER — METOPROLOL SUCCINATE 50 MG/1
1 TABLET TABLET, FILM COATED, EXTENDED RELEASE ORAL ONCE A DAY
Status: ACTIVE | COMMUNITY

## 2023-03-13 RX ORDER — CITALOPRAM HYDROBROMIDE 40 MG/1
0.5 TABLET TABLET, FILM COATED ORAL ONCE A DAY
Status: ACTIVE | COMMUNITY

## 2023-03-13 RX ORDER — FAMOTIDINE 40 MG/1
TAKE ONE TABLET BY MOUTH AT BEDTIME TABLET, FILM COATED ORAL
Qty: 90 TABLET | Refills: 3 | Status: ACTIVE | COMMUNITY

## 2023-03-13 NOTE — HPI-TODAY'S VISIT:
. She continues to have difficulty with diffuse chronic abdominal pain.  Her encephalopathy is better.  She is compliant with lactulose and Xifaxan.  She is accompanied today by her family members.  She is not able to drive.   She is compliant with famotidine and pantoprazole therapy. . Abdominal ultrasound November 2022.  Negative for significant ascites.  Consistent with cirrhosis. . She was readmitted to [facility] in late October secondary to hepatic encephalopathy and elevated ammonia levels.

## 2023-03-13 NOTE — HPI-TODAY'S VISIT:
52-year-old female with an extensive medical history to include febrile pancreatitis with SIRS and questionable pancreas divisum s/p total pancreatectomy, splenectomy, and cholecystectomy (2009, Dr. Niko Martin at [facility] multiple abdominal hernia repairs, presenting for follow-up regarding cirrhosis. . She continues to suffer from chronic abdominal pain.  However, she is doing better from a liver standpoint.  Encephalopathy is managed at this point with lactulose and Xifaxan.  No significant diarrhea or rectal bleeding.  No melena.  No dysphagia heartburn or regurgitation.  She is taking pantoprazole twice daily with famotidine. . She did see Dr. Link.  He recommended that she not have surgery given her situation.  He felt the risk was extremely high.  He agreed with the previous decision to hold off on surgery in December 2022. . She has a history of bladder distention.  Likely a neurogenic bladder.  Associate with diabetes.  She had an appointment with urology today.  She missed it because she wanted to make the appointment with my office.  We called Dr. Cuello's office.  We could not get her in today but will hopefully she can get in next week.

## 2023-03-13 NOTE — HPI-OTHER HISTORIES
. Colonoscopy February 2023.  Markedly redundant colon notable for considerable looping.  2 mm ascending colonic erosion.  A sending descending and sigmoid colon was biopsied.  Preparation was fair.  Biopsies were notable for no significant abnormalities.  Normal. . Labs January 2023.  Hemoglobin 10.3.  MCV 90.  Platelet count 203K.  INR 1.5.  Creatinine 0.7.  AST 57, ALT 29.  Alkaline phosphatase 221.  Total bilirubin 0.8. . CT scan abdomen pelvis January 2023.  Hepatomegaly noted.  Postsurgical changes of the bowel identified.  Urinary bladder was markedly distended.  Right-sided nonobstructing kidney stones identified.  Evidence of previous cholecystectomy pancreatectomy and splenectomy noted. . Gastrografin enema.  December 2022.  No evidence for bowel obstruction or volvulus. . Colonoscopy December 2022.  Dr. Villagran.  Poor prep.  Discontinuous areas of ulcerative mucosa in the sigmoid colon.  The lumen of the sigmoid colon was significantly dilated. . KUB December 2022.  Dilated loops of colon within the mid and upper abdomen suggesting possible obstruction versus ileus. . CT abdomen pelvis December 14, 2022.  Obstruction within the distal sigmoid colon with proximal dilation of the colon and majority of the small bowel.  Decompressed and thickened distal sigmoid colon and upper rectum. . Ultrasound November 2022.  Surgically absent gallbladder.  4 mm common bile duct.  Spleen is surgically absent.  Splenosis noted.  Cirrhotic liver.  No mass was noted. . MRI of the abdomen with and without contrast 7/24/2022 showed cirrhosis with a small amount of ascites and intrahepatic lesions consistent with splenosis. . Abdominal ultrasound 7/22/2022 showed cirrhotic hepatic morphology with a 1.7 cm hypoechoic mass in the left hepatic lobe . CT of the abdomen and pelvis without contrast 7/21/2022 showed multiple stable hyperattenuating lesions along the liver capsule, largest measuring 1.9 cm, consistent with spondylosis.  Scant perihepatic ascites.  Atrophic pancreas with postsurgical changes/clips.  Status post cholecystectomy and splenectomy with multiple splenules noted.  Postsurgical changes of the stomach and small bowel consistent with gastric bypass.  Unchanged stranding within the mesentery and retroperitoneum.  No acute process. . Labs 7/1/2022:H/H11.5/34.9, MCV 98.6, , WBC 8.6.  AST 74, ALT 27, , T bili 1.4, albumin 4, total protein 8.2, CMP otherwise unremarkable aside from glucose 153.  Negative hepatitis A antibody IgM, hepatitis B core antibody IgM, hepatitis B surface antigen, hepatitis C antibody.  Normal vitamin B12 and folate. . Labs September 2021.  B12 788, CRP 5, anti-smooth muscle antibody negative, antimitochondrial antibody negative, vitamin D normal at 34, antigliadin antibody negative, antitissue transglutaminase antibody negative, glucose 426, alkaline phosphatase 219, AST 91, ALT 51, hepatitis serology negative, ferritin 504, hemoglobin 13 . CT a/p wo contrast September 2021.  Prior splenectomy.  Splenosis was noted.  Gastric bypass status.  Nonobstructing kidney stone.  No acute abnormalities identified . Labs August 2021.  AST 74, ALT 47, alkaline phosphatase 194 . Colonoscopy 2021 [facility]. Normal per patient. . EGD  6/1/2021.  Findings included a widely patent gastrojejunostomy, a solitary jejunal erosion with adherent blood just distal of the anastomosis, mild nonerosive corpus gastritis, and normal jejunal limb status post unremarkable random biopsies.  Gastric biopsies were negative for H. pylori.  . Labs 4/14/2021:H/H 10.1/33, MCV 85.3, , WBC 10.5.  Ferritin 26.1.  Normal folate and vitamin B12.  AST 67, ALT 29, , T bili 0.7, CMP otherwise unremarkable aside from glucose 124. . She has a complicated medical history, and unfortunately is a fairly poor historian. Per a consult note from Dr. Bess in 2009, she was hospitalized at [facility] for abdominal pain related to acute, recurrent pancreatitis suspected to be related to familial hyperlipidemia. Her hospital course was complicated by respiratory distress syndrome requiring mechanical ventilation; prior ERCP findings raised concern for possible pancreas divisum. Due to the need for possible surgical intervention, she was transferred to [facility].  . Per the patient, she was hospitalized at [facility] for 7 months, undergoing numerous surgeries. Initially, the head of the pancreas was removed, but symptoms persisted. This led to a splenectomy, and ultimately a total pancreatectomy. 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 Her initial surgeries were performed by Dr. Martin at [facility]; she has since followed with Dr. Buckner and undergone multiple hernia repairs.

## 2023-03-15 ENCOUNTER — WEB ENCOUNTER (OUTPATIENT)
Dept: URBAN - METROPOLITAN AREA CLINIC 113 | Facility: CLINIC | Age: 53
End: 2023-03-15

## 2023-03-22 ENCOUNTER — WEB ENCOUNTER (OUTPATIENT)
Dept: URBAN - METROPOLITAN AREA CLINIC 113 | Facility: CLINIC | Age: 53
End: 2023-03-22

## 2023-03-22 PROBLEM — 8801005 SECONDARY DIABETES MELLITUS: Status: ACTIVE | Noted: 2023-03-22

## 2023-03-27 ENCOUNTER — TELEPHONE ENCOUNTER (OUTPATIENT)
Dept: URBAN - METROPOLITAN AREA CLINIC 113 | Facility: CLINIC | Age: 53
End: 2023-03-27

## 2023-03-27 RX ORDER — HYOSCYAMINE SULFATE 0.12 MG/1
1 TABLET UNDER THE TONGUE AND ALLOW TO DISSOLVE AS NEEDED FOR ABDOMINAL PAIN TABLET, ORALLY DISINTEGRATING ORAL
Qty: 60 | Refills: 4 | OUTPATIENT
Start: 2023-03-29 | End: 2023-08-26

## 2023-03-30 ENCOUNTER — OFFICE VISIT (OUTPATIENT)
Dept: URBAN - METROPOLITAN AREA CLINIC 113 | Facility: CLINIC | Age: 53
End: 2023-03-30

## 2023-04-06 ENCOUNTER — WEB ENCOUNTER (OUTPATIENT)
Dept: URBAN - METROPOLITAN AREA CLINIC 113 | Facility: CLINIC | Age: 53
End: 2023-04-06

## 2023-04-18 ENCOUNTER — WEB ENCOUNTER (OUTPATIENT)
Dept: URBAN - METROPOLITAN AREA CLINIC 113 | Facility: CLINIC | Age: 53
End: 2023-04-18

## 2023-04-19 ENCOUNTER — TELEPHONE ENCOUNTER (OUTPATIENT)
Dept: URBAN - METROPOLITAN AREA CLINIC 113 | Facility: CLINIC | Age: 53
End: 2023-04-19

## 2023-04-20 ENCOUNTER — WEB ENCOUNTER (OUTPATIENT)
Dept: URBAN - METROPOLITAN AREA CLINIC 113 | Facility: CLINIC | Age: 53
End: 2023-04-20

## 2023-04-20 ENCOUNTER — LAB OUTSIDE AN ENCOUNTER (OUTPATIENT)
Dept: URBAN - METROPOLITAN AREA CLINIC 113 | Facility: CLINIC | Age: 53
End: 2023-04-20

## 2023-05-04 ENCOUNTER — OFFICE VISIT (OUTPATIENT)
Dept: URBAN - METROPOLITAN AREA CLINIC 113 | Facility: CLINIC | Age: 53
End: 2023-05-04
Payer: MEDICARE

## 2023-05-04 VITALS
BODY MASS INDEX: 28.82 KG/M2 | HEIGHT: 62 IN | RESPIRATION RATE: 22 BRPM | TEMPERATURE: 98.6 F | HEART RATE: 99 BPM | SYSTOLIC BLOOD PRESSURE: 179 MMHG | WEIGHT: 156.6 LBS | DIASTOLIC BLOOD PRESSURE: 80 MMHG

## 2023-05-04 DIAGNOSIS — K74.60 CIRRHOSIS: ICD-10-CM

## 2023-05-04 DIAGNOSIS — R10.13 EPIGASTRIC ABDOMINAL PAIN: ICD-10-CM

## 2023-05-04 DIAGNOSIS — K31.84 GASTROPARESIS: ICD-10-CM

## 2023-05-04 DIAGNOSIS — K59.01 CONSTIPATION: ICD-10-CM

## 2023-05-04 PROCEDURE — 99214 OFFICE O/P EST MOD 30 MIN: CPT | Performed by: INTERNAL MEDICINE

## 2023-05-04 RX ORDER — ROSUVASTATIN CALCIUM 5 MG/1
1 TABLET TABLET, FILM COATED ORAL ONCE A DAY
Status: ACTIVE | COMMUNITY

## 2023-05-04 RX ORDER — PANCRELIPASE 36000; 180000; 114000 [USP'U]/1; [USP'U]/1; [USP'U]/1
3 TABS WITH MEALS, ONE TAB WITH SNACKS CAPSULE, DELAYED RELEASE PELLETS ORAL
Qty: 1080 | Refills: 4 | OUTPATIENT
Start: 2023-05-04 | End: 2024-07-27

## 2023-05-04 RX ORDER — DICYCLOMINE HYDROCHLORIDE 20 MG/1
1 TABLET TABLET ORAL
Qty: 100 | Refills: 3 | Status: ACTIVE | COMMUNITY
End: 2024-01-20

## 2023-05-04 RX ORDER — PANTOPRAZOLE SODIUM 40 MG/1
TAKE 1 TABLET DAILY TABLET, DELAYED RELEASE ORAL TWICE DAILY
Qty: 180 | Refills: 3 | Status: ACTIVE | COMMUNITY
Start: 2022-11-30

## 2023-05-04 RX ORDER — HYDROCHLOROTHIAZIDE 12.5 MG/1
1 TABLET IN THE MORNING TABLET ORAL ONCE A DAY
Status: ACTIVE | COMMUNITY

## 2023-05-04 RX ORDER — PANTOPRAZOLE SODIUM 40 MG/1
1 TABLET TABLET, DELAYED RELEASE ORAL ONCE A DAY
Qty: 90 | Refills: 3 | Status: ACTIVE | COMMUNITY
Start: 2022-11-30

## 2023-05-04 RX ORDER — INSULIN DEGLUDEC INJECTION 200 U/ML
AS DIRECTED INJECTION, SOLUTION SUBCUTANEOUS
Status: ACTIVE | COMMUNITY

## 2023-05-04 RX ORDER — CITALOPRAM HYDROBROMIDE 40 MG/1
0.5 TABLET TABLET, FILM COATED ORAL ONCE A DAY
Status: ACTIVE | COMMUNITY

## 2023-05-04 RX ORDER — ONDANSETRON 8 MG/1
DISSOLVE 1 TABLET ON THE  TONGUE 3 TIMES DAILY AS  NEEDED FOR NAUSEA TABLET, ORALLY DISINTEGRATING ORAL
Qty: 145 | Refills: 3 | Status: ACTIVE | COMMUNITY

## 2023-05-04 RX ORDER — LACTULOSE 10 G/15ML
45 ML SOLUTION ORAL
Qty: 12150 ML | Refills: 3 | Status: ACTIVE | COMMUNITY
End: 2024-01-20

## 2023-05-04 RX ORDER — PANTOPRAZOLE SODIUM 40 MG/1
TAKE 1 TABLET DAILY TABLET, DELAYED RELEASE ORAL TWICE DAILY
Qty: 180 | Refills: 3 | Status: ACTIVE | COMMUNITY
Start: 2022-09-08

## 2023-05-04 RX ORDER — TELMISARTAN 40 MG/1
1 TABLET TABLET ORAL ONCE A DAY
Status: ACTIVE | COMMUNITY

## 2023-05-04 RX ORDER — PANCRELIPASE 36000; 180000; 114000 [USP'U]/1; [USP'U]/1; [USP'U]/1
TWO TABLETS CAPSULE, DELAYED RELEASE PELLETS ORAL
Qty: 540 | Refills: 3 | Status: ACTIVE | COMMUNITY

## 2023-05-04 RX ORDER — FENOFIBRATE 200 MG/1
1 CAPSULE WITH A MEAL CAPSULE ORAL ONCE A DAY
Status: ACTIVE | COMMUNITY

## 2023-05-04 RX ORDER — MIRABEGRON 25 MG/1
1 TABLET TABLET, FILM COATED, EXTENDED RELEASE ORAL ONCE A DAY
Status: ACTIVE | COMMUNITY

## 2023-05-04 RX ORDER — RIFAXIMIN 550 MG/1
1 TABLET TABLET ORAL TWICE A DAY
Qty: 180 | Refills: 3 | Status: ACTIVE | COMMUNITY
End: 2024-02-10

## 2023-05-04 RX ORDER — PROMETHAZINE HYDROCHLORIDE 12.5 MG/1
1 TABLET TABLET ORAL
Qty: 45 | Refills: 4 | OUTPATIENT
Start: 2023-05-04 | End: 2023-10-01

## 2023-05-04 RX ORDER — PANCRELIPASE 36000; 180000; 114000 [USP'U]/1; [USP'U]/1; [USP'U]/1
TWO TABLETS CAPSULE, DELAYED RELEASE PELLETS ORAL
Qty: 720 | Refills: 3 | Status: ACTIVE | COMMUNITY
End: 2024-01-20

## 2023-05-04 RX ORDER — HYOSCYAMINE SULFATE 0.12 MG/1
1 TABLET UNDER THE TONGUE AND ALLOW TO DISSOLVE AS NEEDED FOR ABDOMINAL PAIN TABLET, ORALLY DISINTEGRATING ORAL
Qty: 60 | Refills: 4 | Status: ACTIVE | COMMUNITY
Start: 2023-03-29 | End: 2023-08-26

## 2023-05-04 RX ORDER — LINACLOTIDE 290 UG/1
1 CAPSULE AT LEAST 30 MINUTES BEFORE THE FIRST MEAL OF THE DAY ON AN EMPTY STOMACH CAPSULE, GELATIN COATED ORAL ONCE A DAY
Qty: 90 | Refills: 3 | Status: ACTIVE | COMMUNITY
End: 2024-01-20

## 2023-05-04 RX ORDER — LACTULOSE 10 G/15ML
60 ML SOLUTION ORAL
Qty: 1620 ML | Refills: 11 | OUTPATIENT
Start: 2023-05-04 | End: 2026-04-18

## 2023-05-04 RX ORDER — BUPROPION HYDROCHLORIDE 150 MG/1
1 TABLET IN THE MORNING TABLET, FILM COATED, EXTENDED RELEASE ORAL ONCE A DAY
Status: ACTIVE | COMMUNITY

## 2023-05-04 RX ORDER — METOPROLOL SUCCINATE 50 MG/1
1 TABLET TABLET, FILM COATED, EXTENDED RELEASE ORAL ONCE A DAY
Status: ACTIVE | COMMUNITY

## 2023-05-04 RX ORDER — PROMETHAZINE HYDROCHLORIDE 12.5 MG/1
1 TABLET AS NEEDED TABLET ORAL
Status: ACTIVE | COMMUNITY

## 2023-05-04 RX ORDER — FAMOTIDINE 40 MG/1
TAKE ONE TABLET BY MOUTH AT BEDTIME TABLET, FILM COATED ORAL
Qty: 90 TABLET | Refills: 3 | Status: ACTIVE | COMMUNITY

## 2023-05-04 RX ORDER — HUMAN INSULIN 100 [IU]/ML
AS DIRECTED INJECTION, SUSPENSION SUBCUTANEOUS
Status: ACTIVE | COMMUNITY

## 2023-05-04 RX ORDER — MULTIVIT-MIN/FA/LYCOPEN/LUTEIN .4-300-25
AS DIRECTED TABLET ORAL
Status: ACTIVE | COMMUNITY

## 2023-05-04 NOTE — HPI-TODAY'S VISIT:
52-year-old female with an extensive medical history to include febrile pancreatitis with SIRS and questionable pancreas divisum s/p total pancreatectomy, splenectomy, and cholecystectomy (2009, Dr. Niko Martin at [facility]), multiple abdominal hernia repairs, presenting for follow-up regarding cirrhosis. . She continues to have issues with chronic abdominal pain.  She has chronic constipation.  Despite the fact that she takes 60 cc of lactulose 3 times daily with Linzess and sometimes milk of magnesia.  She is compliant with her Creon therapy.  She also is compliant with pantoprazole therapy.  She continues to follow with her primary care physician.  She is sees Dr. Cuello.  There was no recommendation for intermittent catheterization. Encephalopathy is managed at this point with lactulose and Xifaxan.  No significant diarrhea or rectal bleeding.  No melena.  No dysphagia heartburn or regurgitation.  She is taking pantoprazole twice daily with famotidine. . She did see Dr. Link.  He recommended that she not have surgery given her situation.  He felt the risk was extremely high.  He agreed with the previous decision to hold off on surgery in December 2022. . She has a history of bladder distention.  Likely a neurogenic bladder.  Associated with diabetes.  She is followed in Dr. Cuello's office.

## 2023-05-04 NOTE — HPI-OTHER HISTORIES
. CT scan abdomen pelvis with IV contrast.  March 2023. Moderate stool throughout the colon.  Small ventral hernia.  Cirrhosis.  Small amount of ascites.  Multiple splenules.  No acute process identified. . Colonoscopy February 2023.  Markedly redundant colon notable for considerable looping.  2 mm ascending colonic erosion.  A sending descending and sigmoid colon was biopsied.  Preparation was fair.  Biopsies were notable for no significant abnormalities.  Normal. . Labs January 2023.  Hemoglobin 10.3.  MCV 90.  Platelet count 203K.  INR 1.5.  Creatinine 0.7.  AST 57, ALT 29.  Alkaline phosphatase 221.  Total bilirubin 0.8. . CT scan abdomen pelvis January 2023.  Hepatomegaly noted.  Postsurgical changes of the bowel identified.  Urinary bladder was markedly distended.  Right-sided nonobstructing kidney stones identified.  Evidence of previous cholecystectomy pancreatectomy and splenectomy noted. . Gastrografin enema.  December 2022.  No evidence for bowel obstruction or volvulus. . Colonoscopy December 2022.  Dr. Villagran.  Poor prep.  Discontinuous areas of ulcerative mucosa in the sigmoid colon.  The lumen of the sigmoid colon was significantly dilated. . KUB December 2022.  Dilated loops of colon within the mid and upper abdomen suggesting possible obstruction versus ileus. . CT abdomen pelvis December 14, 2022.  Obstruction within the distal sigmoid colon with proximal dilation of the colon and majority of the small bowel.  Decompressed and thickened distal sigmoid colon and upper rectum. . Ultrasound November 2022.  Surgically absent gallbladder.  4 mm common bile duct.  Spleen is surgically absent.  Splenosis noted.  Cirrhotic liver.  No mass was noted. . Abdominal ultrasound November 2022.  Negative for significant ascites.  Consistent with cirrhosis. . MRI of the abdomen with and without contrast 7/24/2022 showed cirrhosis with a small amount of ascites and intrahepatic lesions consistent with splenosis. . Abdominal ultrasound 7/22/2022 showed cirrhotic hepatic morphology with a 1.7 cm hypoechoic mass in the left hepatic lobe . CT of the abdomen and pelvis without contrast 7/21/2022 showed multiple stable hyperattenuating lesions along the liver capsule, largest measuring 1.9 cm, consistent with spondylosis.  Scant perihepatic ascites.  Atrophic pancreas with postsurgical changes/clips.  Status post cholecystectomy and splenectomy with multiple splenules noted.  Postsurgical changes of the stomach and small bowel consistent with gastric bypass.  Unchanged stranding within the mesentery and retroperitoneum.  No acute process. . Labs 7/1/2022:H/H11.5/34.9, MCV 98.6, , WBC 8.6.  AST 74, ALT 27, , T bili 1.4, albumin 4, total protein 8.2, CMP otherwise unremarkable aside from glucose 153.  Negative hepatitis A antibody IgM, hepatitis B core antibody IgM, hepatitis B surface antigen, hepatitis C antibody.  Normal vitamin B12 and folate. . Labs September 2021.  B12 788, CRP 5, anti-smooth muscle antibody negative, antimitochondrial antibody negative, vitamin D normal at 34, antigliadin antibody negative, antitissue transglutaminase antibody negative, glucose 426, alkaline phosphatase 219, AST 91, ALT 51, hepatitis serology negative, ferritin 504, hemoglobin 13 . CT a/p wo contrast September 2021.  Prior splenectomy.  Splenosis was noted.  Gastric bypass status.  Nonobstructing kidney stone.  No acute abnormalities identified . Labs August 2021.  AST 74, ALT 47, alkaline phosphatase 194 . Colonoscopy 2021 [facility]. Normal per patient. . EGD  6/1/2021.  Findings included a widely patent gastrojejunostomy, a solitary jejunal erosion with adherent blood just distal of the anastomosis, mild nonerosive corpus gastritis, and normal jejunal limb status post unremarkable random biopsies.  Gastric biopsies were negative for H. pylori.  . Labs 4/14/2021:H/H 10.1/33, MCV 85.3, , WBC 10.5.  Ferritin 26.1.  Normal folate and vitamin B12.  AST 67, ALT 29, , T bili 0.7, CMP otherwise unremarkable aside from glucose 124. . She has a complicated medical history, and unfortunately is a fairly poor historian. Per a consult note from Dr. Bess in 2009, she was hospitalized at [facility] for abdominal pain related to acute, recurrent pancreatitis suspected to be related to familial hyperlipidemia. Her hospital course was complicated by respiratory distress syndrome requiring mechanical ventilation; prior ERCP findings raised concern for possible pancreas divisum. Due to the need for possible surgical intervention, she was transferred to [facility].  . Per the patient, she was hospitalized at [facility] for 7 months, undergoing numerous surgeries. Initially, the head of the pancreas was removed, but symptoms persisted. This led to a splenectomy, and ultimately a total pancreatectomy. 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 Her initial surgeries were performed by Dr. Martin at [facility]; she has since followed with Dr. Buckner and undergone multiple hernia repairs.

## 2023-05-04 NOTE — HPI-TODAY'S VISIT:
. She is accompanied today by her daughter. She is not able to drive.   She is compliant with famotidine and pantoprazole therapy. . She was readmitted to [facility] in late October 2022 secondary to hepatic encephalopathy and elevated ammonia levels.

## 2023-05-16 ENCOUNTER — WEB ENCOUNTER (OUTPATIENT)
Dept: URBAN - METROPOLITAN AREA CLINIC 113 | Facility: CLINIC | Age: 53
End: 2023-05-16

## 2023-05-17 ENCOUNTER — LAB OUTSIDE AN ENCOUNTER (OUTPATIENT)
Dept: URBAN - METROPOLITAN AREA CLINIC 113 | Facility: CLINIC | Age: 53
End: 2023-05-17

## 2023-06-01 ENCOUNTER — WEB ENCOUNTER (OUTPATIENT)
Dept: URBAN - METROPOLITAN AREA CLINIC 113 | Facility: CLINIC | Age: 53
End: 2023-06-01

## 2023-06-09 ENCOUNTER — TELEPHONE ENCOUNTER (OUTPATIENT)
Dept: URBAN - METROPOLITAN AREA CLINIC 113 | Facility: CLINIC | Age: 53
End: 2023-06-09

## 2023-06-12 ENCOUNTER — WEB ENCOUNTER (OUTPATIENT)
Dept: URBAN - METROPOLITAN AREA CLINIC 113 | Facility: CLINIC | Age: 53
End: 2023-06-12

## 2023-06-17 ENCOUNTER — LAB OUTSIDE AN ENCOUNTER (OUTPATIENT)
Dept: URBAN - METROPOLITAN AREA CLINIC 113 | Facility: CLINIC | Age: 53
End: 2023-06-17

## 2023-06-21 ENCOUNTER — WEB ENCOUNTER (OUTPATIENT)
Dept: URBAN - METROPOLITAN AREA CLINIC 113 | Facility: CLINIC | Age: 53
End: 2023-06-21

## 2023-06-24 ENCOUNTER — TELEPHONE ENCOUNTER (OUTPATIENT)
Dept: URBAN - METROPOLITAN AREA CLINIC 113 | Facility: CLINIC | Age: 53
End: 2023-06-24

## 2023-06-28 ENCOUNTER — TELEPHONE ENCOUNTER (OUTPATIENT)
Dept: URBAN - METROPOLITAN AREA CLINIC 6 | Facility: CLINIC | Age: 53
End: 2023-06-28

## 2023-08-27 ENCOUNTER — TELEPHONE ENCOUNTER (OUTPATIENT)
Dept: URBAN - METROPOLITAN AREA CLINIC 113 | Facility: CLINIC | Age: 53
End: 2023-08-27

## 2023-08-27 ENCOUNTER — WEB ENCOUNTER (OUTPATIENT)
Dept: URBAN - METROPOLITAN AREA CLINIC 113 | Facility: CLINIC | Age: 53
End: 2023-08-27

## 2023-09-05 ENCOUNTER — LAB OUTSIDE AN ENCOUNTER (OUTPATIENT)
Dept: URBAN - METROPOLITAN AREA CLINIC 113 | Facility: CLINIC | Age: 53
End: 2023-09-05

## 2023-09-05 ENCOUNTER — TELEPHONE ENCOUNTER (OUTPATIENT)
Dept: URBAN - METROPOLITAN AREA CLINIC 113 | Facility: CLINIC | Age: 53
End: 2023-09-05

## 2023-09-05 RX ORDER — POLYETHYLENE GLYCOL 3350, SODIUM CHLORIDE, SODIUM BICARBONATE AND POTASSIUM CHLORIDE WITH LEMON FLAVOR 420; 11.2; 5.72; 1.48 G/4L; G/4L; G/4L; G/4L
TAKE 1/2 GALLON AT 5:00 PM DAY BEFORE PROCEDURE, TAKE SECOND 1/2 OF GALLON 6 HRS PRIOR TO PROCEDURE POWDER, FOR SOLUTION ORAL ONCE
Qty: 420 GM | Refills: 0 | OUTPATIENT
Start: 2023-09-05 | End: 2023-09-06

## 2023-09-05 RX ORDER — LACTULOSE 10 G/15ML
45 ML SOLUTION ORAL
Qty: 12150 ML | Refills: 3 | Status: ACTIVE | COMMUNITY
End: 2024-01-20

## 2023-09-05 RX ORDER — HUMAN INSULIN 100 [IU]/ML
AS DIRECTED INJECTION, SUSPENSION SUBCUTANEOUS
Status: ACTIVE | COMMUNITY

## 2023-09-05 RX ORDER — PANCRELIPASE 36000; 180000; 114000 [USP'U]/1; [USP'U]/1; [USP'U]/1
3 TABS WITH MEALS, ONE TAB WITH SNACKS CAPSULE, DELAYED RELEASE PELLETS ORAL
Qty: 1080 | Refills: 4 | Status: ACTIVE | COMMUNITY
Start: 2023-05-04 | End: 2024-07-27

## 2023-09-05 RX ORDER — FAMOTIDINE 40 MG/1
TAKE ONE TABLET BY MOUTH AT BEDTIME TABLET, FILM COATED ORAL
Qty: 90 TABLET | Refills: 3 | Status: ACTIVE | COMMUNITY

## 2023-09-05 RX ORDER — PANCRELIPASE 36000; 180000; 114000 [USP'U]/1; [USP'U]/1; [USP'U]/1
TWO TABLETS CAPSULE, DELAYED RELEASE PELLETS ORAL
Qty: 540 | Refills: 3 | Status: ACTIVE | COMMUNITY

## 2023-09-05 RX ORDER — PANTOPRAZOLE SODIUM 40 MG/1
TAKE 1 TABLET DAILY TABLET, DELAYED RELEASE ORAL TWICE DAILY
Qty: 180 | Refills: 3 | Status: ACTIVE | COMMUNITY
Start: 2022-11-30

## 2023-09-05 RX ORDER — ONDANSETRON 8 MG/1
DISSOLVE 1 TABLET ON THE  TONGUE 3 TIMES DAILY AS  NEEDED FOR NAUSEA TABLET, ORALLY DISINTEGRATING ORAL
Qty: 145 | Refills: 3 | Status: ACTIVE | COMMUNITY

## 2023-09-05 RX ORDER — INSULIN DEGLUDEC INJECTION 200 U/ML
AS DIRECTED INJECTION, SOLUTION SUBCUTANEOUS
Status: ACTIVE | COMMUNITY

## 2023-09-05 RX ORDER — ROSUVASTATIN CALCIUM 5 MG/1
1 TABLET TABLET, FILM COATED ORAL ONCE A DAY
Status: ACTIVE | COMMUNITY

## 2023-09-05 RX ORDER — CITALOPRAM HYDROBROMIDE 40 MG/1
0.5 TABLET TABLET, FILM COATED ORAL ONCE A DAY
Status: ACTIVE | COMMUNITY

## 2023-09-05 RX ORDER — PANTOPRAZOLE SODIUM 40 MG/1
1 TABLET TABLET, DELAYED RELEASE ORAL ONCE A DAY
Qty: 90 | Refills: 3 | Status: ACTIVE | COMMUNITY
Start: 2022-11-30

## 2023-09-05 RX ORDER — HYDROCHLOROTHIAZIDE 12.5 MG/1
1 TABLET IN THE MORNING TABLET ORAL ONCE A DAY
Status: ACTIVE | COMMUNITY

## 2023-09-05 RX ORDER — PANCRELIPASE 36000; 180000; 114000 [USP'U]/1; [USP'U]/1; [USP'U]/1
TWO TABLETS CAPSULE, DELAYED RELEASE PELLETS ORAL
Qty: 720 | Refills: 3 | Status: ACTIVE | COMMUNITY
End: 2024-01-20

## 2023-09-05 RX ORDER — TELMISARTAN 40 MG/1
1 TABLET TABLET ORAL ONCE A DAY
Status: ACTIVE | COMMUNITY

## 2023-09-05 RX ORDER — BUPROPION HYDROCHLORIDE 150 MG/1
1 TABLET IN THE MORNING TABLET, FILM COATED, EXTENDED RELEASE ORAL ONCE A DAY
Status: ACTIVE | COMMUNITY

## 2023-09-05 RX ORDER — MULTIVIT-MIN/FA/LYCOPEN/LUTEIN .4-300-25
AS DIRECTED TABLET ORAL
Status: ACTIVE | COMMUNITY

## 2023-09-05 RX ORDER — LACTULOSE 10 G/15ML
60 ML SOLUTION ORAL
Qty: 1620 ML | Refills: 11 | Status: ACTIVE | COMMUNITY
Start: 2023-05-04 | End: 2026-04-18

## 2023-09-05 RX ORDER — DICYCLOMINE HYDROCHLORIDE 20 MG/1
1 TABLET TABLET ORAL
Qty: 100 | Refills: 3 | Status: ACTIVE | COMMUNITY
End: 2024-01-20

## 2023-09-05 RX ORDER — LINACLOTIDE 290 UG/1
1 CAPSULE AT LEAST 30 MINUTES BEFORE THE FIRST MEAL OF THE DAY ON AN EMPTY STOMACH CAPSULE, GELATIN COATED ORAL ONCE A DAY
Qty: 90 | Refills: 3 | Status: ACTIVE | COMMUNITY
End: 2024-01-20

## 2023-09-05 RX ORDER — PROMETHAZINE HYDROCHLORIDE 12.5 MG/1
1 TABLET TABLET ORAL
Qty: 45 | Refills: 4 | Status: ACTIVE | COMMUNITY
Start: 2023-05-04 | End: 2023-10-01

## 2023-09-05 RX ORDER — METOPROLOL SUCCINATE 50 MG/1
1 TABLET TABLET, FILM COATED, EXTENDED RELEASE ORAL ONCE A DAY
Status: ACTIVE | COMMUNITY

## 2023-09-05 RX ORDER — FENOFIBRATE 200 MG/1
1 CAPSULE WITH A MEAL CAPSULE ORAL ONCE A DAY
Status: ACTIVE | COMMUNITY

## 2023-09-05 RX ORDER — RIFAXIMIN 550 MG/1
1 TABLET TABLET ORAL TWICE A DAY
Qty: 180 | Refills: 3 | Status: ACTIVE | COMMUNITY
End: 2024-02-10

## 2023-09-05 RX ORDER — MIRABEGRON 25 MG/1
1 TABLET TABLET, FILM COATED, EXTENDED RELEASE ORAL ONCE A DAY
Status: ACTIVE | COMMUNITY

## 2023-09-05 RX ORDER — PROMETHAZINE HYDROCHLORIDE 12.5 MG/1
1 TABLET AS NEEDED TABLET ORAL
Status: ACTIVE | COMMUNITY

## 2023-09-05 RX ORDER — PANTOPRAZOLE SODIUM 40 MG/1
TAKE 1 TABLET DAILY TABLET, DELAYED RELEASE ORAL TWICE DAILY
Qty: 180 | Refills: 3 | Status: ACTIVE | COMMUNITY
Start: 2022-09-08

## 2023-09-07 ENCOUNTER — OFFICE VISIT (OUTPATIENT)
Dept: URBAN - METROPOLITAN AREA CLINIC 113 | Facility: CLINIC | Age: 53
End: 2023-09-07

## 2023-09-13 ENCOUNTER — OFFICE VISIT (OUTPATIENT)
Dept: URBAN - METROPOLITAN AREA MEDICAL CENTER 19 | Facility: MEDICAL CENTER | Age: 53
End: 2023-09-13
Payer: MEDICARE

## 2023-09-13 ENCOUNTER — ERX REFILL RESPONSE (OUTPATIENT)
Dept: URBAN - METROPOLITAN AREA CLINIC 113 | Facility: CLINIC | Age: 53
End: 2023-09-13

## 2023-09-13 DIAGNOSIS — Q43.8 OTHER SPECIFIED CONGENITAL MALFORMATIONS OF INTESTINE: ICD-10-CM

## 2023-09-13 DIAGNOSIS — K64.4 EXTERNAL HEMORRHOID: ICD-10-CM

## 2023-09-13 DIAGNOSIS — K92.1 RECTAL BLEEDING: ICD-10-CM

## 2023-09-13 PROCEDURE — 45380 COLONOSCOPY AND BIOPSY: CPT | Performed by: INTERNAL MEDICINE

## 2023-09-13 RX ORDER — METOPROLOL SUCCINATE 50 MG/1
1 TABLET TABLET, FILM COATED, EXTENDED RELEASE ORAL ONCE A DAY
Status: ACTIVE | COMMUNITY

## 2023-09-13 RX ORDER — CITALOPRAM HYDROBROMIDE 40 MG/1
0.5 TABLET TABLET, FILM COATED ORAL ONCE A DAY
Status: ACTIVE | COMMUNITY

## 2023-09-13 RX ORDER — INSULIN DEGLUDEC INJECTION 200 U/ML
AS DIRECTED INJECTION, SOLUTION SUBCUTANEOUS
Status: ACTIVE | COMMUNITY

## 2023-09-13 RX ORDER — LACTULOSE 10 G/15ML
45 ML SOLUTION ORAL
Qty: 12150 ML | Refills: 3 | Status: ACTIVE | COMMUNITY
End: 2024-01-20

## 2023-09-13 RX ORDER — LINACLOTIDE 290 UG/1
1 CAPSULE AT LEAST 30 MINUTES BEFORE THE FIRST MEAL OF THE DAY ON AN EMPTY STOMACH CAPSULE, GELATIN COATED ORAL ONCE A DAY
Qty: 90 | Refills: 3 | Status: ACTIVE | COMMUNITY
End: 2024-01-20

## 2023-09-13 RX ORDER — ROSUVASTATIN CALCIUM 5 MG/1
1 TABLET TABLET, FILM COATED ORAL ONCE A DAY
Status: ACTIVE | COMMUNITY

## 2023-09-13 RX ORDER — PANTOPRAZOLE SODIUM 40 MG/1
1 TABLET TABLET, DELAYED RELEASE ORAL ONCE A DAY
Qty: 90 | Refills: 3 | Status: ACTIVE | COMMUNITY
Start: 2022-11-30

## 2023-09-13 RX ORDER — LACTULOSE 10 G/15ML
TAKE 45ML BY MOUTH 3 TIMES DAILY SOLUTION ORAL; RECTAL
Qty: 12298 MILLILITER | Refills: 3 | OUTPATIENT

## 2023-09-13 RX ORDER — HUMAN INSULIN 100 [IU]/ML
AS DIRECTED INJECTION, SUSPENSION SUBCUTANEOUS
Status: ACTIVE | COMMUNITY

## 2023-09-13 RX ORDER — LACTULOSE 10 G/15ML
TAKE 45ML BY MOUTH 3 TIMES DAILY SOLUTION ORAL; RECTAL
Qty: 12298 MILLILITER | Refills: 4 | OUTPATIENT

## 2023-09-13 RX ORDER — PROMETHAZINE HYDROCHLORIDE 12.5 MG/1
1 TABLET TABLET ORAL
Qty: 45 | Refills: 4 | Status: ACTIVE | COMMUNITY
Start: 2023-05-04 | End: 2023-10-01

## 2023-09-13 RX ORDER — MULTIVIT-MIN/FA/LYCOPEN/LUTEIN .4-300-25
AS DIRECTED TABLET ORAL
Status: ACTIVE | COMMUNITY

## 2023-09-13 RX ORDER — PANCRELIPASE 36000; 180000; 114000 [USP'U]/1; [USP'U]/1; [USP'U]/1
TWO TABLETS CAPSULE, DELAYED RELEASE PELLETS ORAL
Qty: 540 | Refills: 3 | Status: ACTIVE | COMMUNITY

## 2023-09-13 RX ORDER — RIFAXIMIN 550 MG/1
1 TABLET TABLET ORAL TWICE A DAY
Qty: 180 | Refills: 3 | Status: ACTIVE | COMMUNITY
End: 2024-02-10

## 2023-09-13 RX ORDER — PROMETHAZINE HYDROCHLORIDE 12.5 MG/1
1 TABLET AS NEEDED TABLET ORAL
Status: ACTIVE | COMMUNITY

## 2023-09-13 RX ORDER — DICYCLOMINE HYDROCHLORIDE 20 MG/1
1 TABLET TABLET ORAL
Qty: 100 | Refills: 3 | Status: ACTIVE | COMMUNITY
End: 2024-01-20

## 2023-09-13 RX ORDER — LACTULOSE 10 G/15ML
60 ML SOLUTION ORAL
Qty: 1620 ML | Refills: 11 | Status: ACTIVE | COMMUNITY
Start: 2023-05-04 | End: 2026-04-18

## 2023-09-13 RX ORDER — HYDROCHLOROTHIAZIDE 12.5 MG/1
1 TABLET IN THE MORNING TABLET ORAL ONCE A DAY
Status: ACTIVE | COMMUNITY

## 2023-09-13 RX ORDER — PANTOPRAZOLE SODIUM 40 MG/1
TAKE 1 TABLET DAILY TABLET, DELAYED RELEASE ORAL TWICE DAILY
Qty: 180 | Refills: 3 | Status: ACTIVE | COMMUNITY
Start: 2022-11-30

## 2023-09-13 RX ORDER — FENOFIBRATE 200 MG/1
1 CAPSULE WITH A MEAL CAPSULE ORAL ONCE A DAY
Status: ACTIVE | COMMUNITY

## 2023-09-13 RX ORDER — PANCRELIPASE 36000; 180000; 114000 [USP'U]/1; [USP'U]/1; [USP'U]/1
3 TABS WITH MEALS, ONE TAB WITH SNACKS CAPSULE, DELAYED RELEASE PELLETS ORAL
Qty: 1080 | Refills: 4 | Status: ACTIVE | COMMUNITY
Start: 2023-05-04 | End: 2024-07-27

## 2023-09-13 RX ORDER — PANTOPRAZOLE SODIUM 40 MG/1
TAKE 1 TABLET DAILY TABLET, DELAYED RELEASE ORAL TWICE DAILY
Qty: 180 | Refills: 3 | Status: ACTIVE | COMMUNITY
Start: 2022-09-08

## 2023-09-13 RX ORDER — PANCRELIPASE 36000; 180000; 114000 [USP'U]/1; [USP'U]/1; [USP'U]/1
TWO TABLETS CAPSULE, DELAYED RELEASE PELLETS ORAL
Qty: 720 | Refills: 3 | Status: ACTIVE | COMMUNITY
End: 2024-01-20

## 2023-09-13 RX ORDER — BUPROPION HYDROCHLORIDE 150 MG/1
1 TABLET IN THE MORNING TABLET, FILM COATED, EXTENDED RELEASE ORAL ONCE A DAY
Status: ACTIVE | COMMUNITY

## 2023-09-13 RX ORDER — ONDANSETRON 8 MG/1
DISSOLVE 1 TABLET ON THE  TONGUE 3 TIMES DAILY AS  NEEDED FOR NAUSEA TABLET, ORALLY DISINTEGRATING ORAL
Qty: 145 | Refills: 3 | Status: ACTIVE | COMMUNITY

## 2023-09-13 RX ORDER — FAMOTIDINE 40 MG/1
TAKE ONE TABLET BY MOUTH AT BEDTIME TABLET, FILM COATED ORAL
Qty: 90 TABLET | Refills: 3 | Status: ACTIVE | COMMUNITY

## 2023-09-13 RX ORDER — MIRABEGRON 25 MG/1
1 TABLET TABLET, FILM COATED, EXTENDED RELEASE ORAL ONCE A DAY
Status: ACTIVE | COMMUNITY

## 2023-09-13 RX ORDER — TELMISARTAN 40 MG/1
1 TABLET TABLET ORAL ONCE A DAY
Status: ACTIVE | COMMUNITY

## 2023-09-15 ENCOUNTER — WEB ENCOUNTER (OUTPATIENT)
Dept: URBAN - METROPOLITAN AREA CLINIC 113 | Facility: CLINIC | Age: 53
End: 2023-09-15

## 2023-09-19 ENCOUNTER — TELEPHONE ENCOUNTER (OUTPATIENT)
Dept: URBAN - METROPOLITAN AREA CLINIC 113 | Facility: CLINIC | Age: 53
End: 2023-09-19

## 2023-09-20 ENCOUNTER — TELEPHONE ENCOUNTER (OUTPATIENT)
Dept: URBAN - METROPOLITAN AREA CLINIC 113 | Facility: CLINIC | Age: 53
End: 2023-09-20

## 2023-09-29 ENCOUNTER — TELEPHONE ENCOUNTER (OUTPATIENT)
Dept: URBAN - METROPOLITAN AREA CLINIC 113 | Facility: CLINIC | Age: 53
End: 2023-09-29

## 2023-10-03 ENCOUNTER — OFFICE VISIT (OUTPATIENT)
Dept: URBAN - METROPOLITAN AREA SURGERY CENTER 25 | Facility: SURGERY CENTER | Age: 53
End: 2023-10-03

## 2023-11-17 ENCOUNTER — WEB ENCOUNTER (OUTPATIENT)
Dept: URBAN - METROPOLITAN AREA CLINIC 113 | Facility: CLINIC | Age: 53
End: 2023-11-17

## 2023-11-18 ENCOUNTER — ERX REFILL RESPONSE (OUTPATIENT)
Dept: URBAN - METROPOLITAN AREA CLINIC 113 | Facility: CLINIC | Age: 53
End: 2023-11-18

## 2023-11-18 RX ORDER — FAMOTIDINE 40 MG/1
TAKE 1 TABLET BY MOUTH ONCE  DAILY AT BEDTIME TABLET, FILM COATED ORAL
Qty: 90 TABLET | Refills: 4 | OUTPATIENT

## 2023-11-18 RX ORDER — FAMOTIDINE 40 MG/1
TAKE 1 TABLET BY MOUTH ONCE  DAILY AT BEDTIME TABLET, FILM COATED ORAL
Qty: 90 TABLET | Refills: 3 | OUTPATIENT

## 2023-11-21 ENCOUNTER — TELEPHONE ENCOUNTER (OUTPATIENT)
Dept: URBAN - METROPOLITAN AREA CLINIC 113 | Facility: CLINIC | Age: 53
End: 2023-11-21

## 2023-11-27 ENCOUNTER — ERX REFILL RESPONSE (OUTPATIENT)
Dept: URBAN - METROPOLITAN AREA CLINIC 113 | Facility: CLINIC | Age: 53
End: 2023-11-27

## 2023-11-27 RX ORDER — LINACLOTIDE 290 UG/1
1 CAPSULE AT LEAST 30 MINUTES BEFORE THE FIRST MEAL OF THE DAY ON AN EMPTY STOMACH CAPSULE, GELATIN COATED ORAL ONCE A DAY
Qty: 90 | Refills: 3 | OUTPATIENT

## 2023-11-27 RX ORDER — PANTOPRAZOLE SODIUM 40 MG/1
TAKE 1 TABLET BY MOUTH DAILY  TWICE DAILY TABLET, DELAYED RELEASE ORAL
Qty: 180 TABLET | Refills: 3 | OUTPATIENT

## 2023-11-27 RX ORDER — PANTOPRAZOLE SODIUM 40 MG/1
TAKE 1 TABLET DAILY TABLET, DELAYED RELEASE ORAL TWICE DAILY
Qty: 180 | Refills: 3 | OUTPATIENT

## 2023-11-28 ENCOUNTER — TELEPHONE ENCOUNTER (OUTPATIENT)
Dept: URBAN - METROPOLITAN AREA CLINIC 113 | Facility: CLINIC | Age: 53
End: 2023-11-28

## 2023-11-28 ENCOUNTER — OFFICE VISIT (OUTPATIENT)
Dept: URBAN - METROPOLITAN AREA CLINIC 113 | Facility: CLINIC | Age: 53
End: 2023-11-28
Payer: MEDICARE

## 2023-11-28 VITALS
RESPIRATION RATE: 16 BRPM | BODY MASS INDEX: 28.89 KG/M2 | SYSTOLIC BLOOD PRESSURE: 183 MMHG | HEART RATE: 75 BPM | DIASTOLIC BLOOD PRESSURE: 76 MMHG | WEIGHT: 157 LBS | TEMPERATURE: 98 F | HEIGHT: 62 IN

## 2023-11-28 DIAGNOSIS — E08.65 DIABETES MELLITUS DUE TO UNDERLYING CONDITION WITH HYPERGLYCEMIA: ICD-10-CM

## 2023-11-28 DIAGNOSIS — K74.60 CIRRHOSIS: ICD-10-CM

## 2023-11-28 DIAGNOSIS — K74.69 CIRRHOSIS, CRYPTOGENIC: ICD-10-CM

## 2023-11-28 DIAGNOSIS — K43.9 VENTRAL HERNIA: ICD-10-CM

## 2023-11-28 DIAGNOSIS — K76.0 HEPATIC STEATOSIS: ICD-10-CM

## 2023-11-28 DIAGNOSIS — K59.01 CONSTIPATION: ICD-10-CM

## 2023-11-28 DIAGNOSIS — K76.9 LIVER LESION: ICD-10-CM

## 2023-11-28 DIAGNOSIS — K21.9 GERD: ICD-10-CM

## 2023-11-28 DIAGNOSIS — K31.84 GASTROPARESIS: ICD-10-CM

## 2023-11-28 DIAGNOSIS — K63.89 COLON DISTENTION: ICD-10-CM

## 2023-11-28 DIAGNOSIS — N32.89 BLADDER DISTENTION: ICD-10-CM

## 2023-11-28 DIAGNOSIS — R18.8 OTHER ASCITES: ICD-10-CM

## 2023-11-28 DIAGNOSIS — R10.13 EPIGASTRIC ABDOMINAL PAIN: ICD-10-CM

## 2023-11-28 DIAGNOSIS — R74.8 ELEVATED LIVER ENZYMES: ICD-10-CM

## 2023-11-28 PROCEDURE — 99214 OFFICE O/P EST MOD 30 MIN: CPT | Performed by: INTERNAL MEDICINE

## 2023-11-28 RX ORDER — PANCRELIPASE 36000; 180000; 114000 [USP'U]/1; [USP'U]/1; [USP'U]/1
TWO TABLETS CAPSULE, DELAYED RELEASE PELLETS ORAL
Qty: 540 | Refills: 3 | Status: ACTIVE | COMMUNITY

## 2023-11-28 RX ORDER — LINACLOTIDE 290 UG/1
1 CAPSULE AT LEAST 30 MINUTES BEFORE THE FIRST MEAL OF THE DAY ON AN EMPTY STOMACH CAPSULE, GELATIN COATED ORAL ONCE A DAY
Qty: 90 | Refills: 3

## 2023-11-28 RX ORDER — LACTULOSE 10 G/15ML
60 ML SOLUTION ORAL
Qty: 1620 ML | Refills: 11 | Status: ACTIVE | COMMUNITY
Start: 2023-05-04 | End: 2026-04-18

## 2023-11-28 RX ORDER — PANTOPRAZOLE SODIUM 40 MG/1
TAKE 1 TABLET BY MOUTH DAILY TWICE DAILY TABLET, DELAYED RELEASE ORAL TWICE DAILY
Qty: 180 | Refills: 3

## 2023-11-28 RX ORDER — LACTULOSE 10 G/15ML
60 ML SOLUTION ORAL
Qty: 1620 ML | Refills: 5
Start: 2023-05-04 | End: 2025-05-21

## 2023-11-28 RX ORDER — PANCRELIPASE 36000; 180000; 114000 [USP'U]/1; [USP'U]/1; [USP'U]/1
3 TABS WITH MEALS, ONE TAB WITH SNACKS CAPSULE, DELAYED RELEASE PELLETS ORAL
Qty: 1080 | Refills: 4 | Status: ACTIVE | COMMUNITY
Start: 2023-05-04 | End: 2024-07-27

## 2023-11-28 RX ORDER — LINACLOTIDE 290 UG/1
1 CAPSULE AT LEAST 30 MINUTES BEFORE THE FIRST MEAL OF THE DAY ON AN EMPTY STOMACH CAPSULE, GELATIN COATED ORAL ONCE A DAY
Qty: 90 | Refills: 3 | Status: ACTIVE | COMMUNITY

## 2023-11-28 RX ORDER — MULTIVIT-MIN/FA/LYCOPEN/LUTEIN .4-300-25
AS DIRECTED TABLET ORAL
Status: ACTIVE | COMMUNITY

## 2023-11-28 RX ORDER — FAMOTIDINE 40 MG/1
TAKE 1 TABLET BY MOUTH ONCE  DAILY AT BEDTIME TABLET, FILM COATED ORAL
Qty: 90 TABLET | Refills: 3 | Status: ACTIVE | COMMUNITY

## 2023-11-28 RX ORDER — LACTULOSE 10 G/15ML
TAKE 45ML BY MOUTH 3 TIMES DAILY SOLUTION ORAL; RECTAL
Qty: 12298 MILLILITER | Refills: 3 | Status: ACTIVE | COMMUNITY

## 2023-11-28 RX ORDER — PROMETHAZINE HYDROCHLORIDE 12.5 MG/1
1 TABLET AS NEEDED TABLET ORAL
Qty: 45 | Refills: 5

## 2023-11-28 RX ORDER — PANCRELIPASE 36000; 180000; 114000 [USP'U]/1; [USP'U]/1; [USP'U]/1
3 TABS WITH MEALS, ONE TAB WITH SNACKS CAPSULE, DELAYED RELEASE PELLETS ORAL
Qty: 300 | Refills: 3
Start: 2023-05-04 | End: 2024-11-22

## 2023-11-28 RX ORDER — INSULIN DEGLUDEC INJECTION 200 U/ML
AS DIRECTED INJECTION, SOLUTION SUBCUTANEOUS
Status: ACTIVE | COMMUNITY

## 2023-11-28 RX ORDER — ONDANSETRON 8 MG/1
DISSOLVE 1 TABLET ON THE TONGUE TABLET, ORALLY DISINTEGRATING ORAL
Qty: 90 | Refills: 3

## 2023-11-28 RX ORDER — PANCRELIPASE 36000; 180000; 114000 [USP'U]/1; [USP'U]/1; [USP'U]/1
TWO TABLETS CAPSULE, DELAYED RELEASE PELLETS ORAL
Refills: 0
End: 2024-02-26

## 2023-11-28 RX ORDER — RIFAXIMIN 550 MG/1
1 TABLET TABLET ORAL TWICE A DAY
Qty: 180 | Refills: 3 | Status: ACTIVE | COMMUNITY
End: 2024-02-10

## 2023-11-28 RX ORDER — FAMOTIDINE 40 MG/1
TAKE 1 TABLET BY MOUTH ONCE DAILY AT BEDTIME TABLET, FILM COATED ORAL ONCE A DAY
Qty: 90 | Refills: 3

## 2023-11-28 RX ORDER — ROSUVASTATIN CALCIUM 5 MG/1
1 TABLET TABLET, FILM COATED ORAL ONCE A DAY
Status: ACTIVE | COMMUNITY

## 2023-11-28 RX ORDER — PANTOPRAZOLE SODIUM 40 MG/1
TAKE 1 TABLET BY MOUTH DAILY  TWICE DAILY TABLET, DELAYED RELEASE ORAL
Qty: 180 TABLET | Refills: 3 | Status: ACTIVE | COMMUNITY

## 2023-11-28 RX ORDER — ONDANSETRON 8 MG/1
DISSOLVE 1 TABLET ON THE  TONGUE 3 TIMES DAILY AS  NEEDED FOR NAUSEA TABLET, ORALLY DISINTEGRATING ORAL
Qty: 145 | Refills: 3 | Status: ACTIVE | COMMUNITY

## 2023-11-28 RX ORDER — FENOFIBRATE 200 MG/1
1 CAPSULE WITH A MEAL CAPSULE ORAL ONCE A DAY
Status: ACTIVE | COMMUNITY

## 2023-11-28 RX ORDER — MIRABEGRON 25 MG/1
1 TABLET TABLET, FILM COATED, EXTENDED RELEASE ORAL ONCE A DAY
Status: ACTIVE | COMMUNITY

## 2023-11-28 RX ORDER — PANCRELIPASE 36000; 180000; 114000 [USP'U]/1; [USP'U]/1; [USP'U]/1
TWO TABLETS CAPSULE, DELAYED RELEASE PELLETS ORAL
Qty: 720 | Refills: 3 | Status: ACTIVE | COMMUNITY
End: 2024-01-20

## 2023-11-28 RX ORDER — HYDROCHLOROTHIAZIDE 12.5 MG/1
1 TABLET IN THE MORNING TABLET ORAL ONCE A DAY
Status: ACTIVE | COMMUNITY

## 2023-11-28 RX ORDER — DICYCLOMINE HYDROCHLORIDE 20 MG/1
1 TABLET TABLET ORAL
Qty: 100 | Refills: 3 | Status: ACTIVE | COMMUNITY
End: 2024-01-20

## 2023-11-28 RX ORDER — TELMISARTAN 40 MG/1
1 TABLET TABLET ORAL ONCE A DAY
Status: ACTIVE | COMMUNITY

## 2023-11-28 RX ORDER — DICYCLOMINE HYDROCHLORIDE 20 MG/1
1 TABLET TABLET ORAL
Qty: 90 | Refills: 3

## 2023-11-28 RX ORDER — PROMETHAZINE HYDROCHLORIDE 12.5 MG/1
1 TABLET AS NEEDED TABLET ORAL
Status: ACTIVE | COMMUNITY

## 2023-11-28 NOTE — HPI-TODAY'S VISIT:
53-year-old female with an extensive medical history to include febrile pancreatitis with SIRS and questionable pancreas divisum s/p total pancreatectomy, splenectomy, and cholecystectomy (2009, Dr. Niko Martin at [facility] [place] multiple abdominal hernia repairs, presenting for follow-up regarding cirrhosis.  She is accompanied today by her daughter. She is not able to drive. She is compliant with famotidine and pantoprazole therapy.  She was admitted to the hospital at [facility] with dehydration in September she had significant hepatic encephalopathy at that time. Since then she has been getting weekly IV fluids. These were ordered by Dr. Castro.  She has chronic nausea. No recent vomiting. Having great difficulty adjusting the lactulose. No rectal bleeding or melena. Abdominal pain is chronic but stable. Weight is relatively stable. She is compliant with lactulose. 6 stools per day. We need to adjust. Also compliant with rifaximin . She did see Dr. Link.  He recommended that she not have surgery given her situation.  He felt the risk was extremely high.  He agreed with the previous decision to hold off on surgery in December 2022. . She has a history of bladder distention.  Likely a neurogenic bladder.  Associated with diabetes.  She is followed in Dr. Cuello's office.

## 2023-11-28 NOTE — HPI-OTHER HISTORIES
Colonoscopy September 2023. Moderately redundant colon notable for looping. Normal colonic mucosa. Moderate external hemorrhoids with some degree of prolapse. Random biopsies were notable for normal colonic mucosa. No evidence for microscopic colitis.  September 2023. Alpha-fetoprotein 4.9  Labs August 2023. AST 56, ALT 27. Alkaline phosphatase 175. Total bilirubin 1.0. INR 1.4, hemoglobin 9.5. Platelet count 155,000. . CT scan abdomen pelvis with IV contrast.  March 2023. Moderate stool throughout the colon.  Small ventral hernia.  Cirrhosis.  Small amount of ascites.  Multiple splenules.  No acute process identified. . Colonoscopy February 2023.  Markedly redundant colon notable for considerable looping.  2 mm ascending colonic erosion.  A sending descending and sigmoid colon was biopsied.  Preparation was fair.  Biopsies were notable for no significant abnormalities.  Normal. . Labs January 2023.  Hemoglobin 10.3.  MCV 90.  Platelet count 203K.  INR 1.5.  Creatinine 0.7.  AST 57, ALT 29.  Alkaline phosphatase 221.  Total bilirubin 0.8. . CT scan abdomen pelvis January 2023.  Hepatomegaly noted.  Postsurgical changes of the bowel identified.  Urinary bladder was markedly distended.  Right-sided nonobstructing kidney stones identified.  Evidence of previous cholecystectomy pancreatectomy and splenectomy noted. . Gastrografin enema.  December 2022.  No evidence for bowel obstruction or volvulus. . Colonoscopy December 2022.  Dr. Villagran.  Poor prep.  Discontinuous areas of ulcerative mucosa in the sigmoid colon.  The lumen of the sigmoid colon was significantly dilated. . KUB December 2022.  Dilated loops of colon within the mid and upper abdomen suggesting possible obstruction versus ileus. . CT abdomen pelvis December 14, 2022.  Obstruction within the distal sigmoid colon with proximal dilation of the colon and majority of the small bowel.  Decompressed and thickened distal sigmoid colon and upper rectum. . Ultrasound November 2022.  Surgically absent gallbladder.  4 mm common bile duct.  Spleen is surgically absent.  Splenosis noted.  Cirrhotic liver.  No mass was noted. . Abdominal ultrasound November 2022.  Negative for significant ascites.  Consistent with cirrhosis. . MRI of the abdomen with and without contrast 7/24/2022 showed cirrhosis with a small amount of ascites and intrahepatic lesions consistent with splenosis. . Abdominal ultrasound 7/22/2022 showed cirrhotic hepatic morphology with a 1.7 cm hypoechoic mass in the left hepatic lobe . CT of the abdomen and pelvis without contrast 7/21/2022 showed multiple stable hyperattenuating lesions along the liver capsule, largest measuring 1.9 cm, consistent with spondylosis.  Scant perihepatic ascites.  Atrophic pancreas with postsurgical changes/clips.  Status post cholecystectomy and splenectomy with multiple splenules noted.  Postsurgical changes of the stomach and small bowel consistent with gastric bypass.  Unchanged stranding within the mesentery and retroperitoneum.  No acute process. . Labs 7/1/2022:H/H11.5/34.9, MCV 98.6, , WBC 8.6.  AST 74, ALT 27, , T bili 1.4, albumin 4, total protein 8.2, CMP otherwise unremarkable aside from glucose 153.  Negative hepatitis A antibody IgM, hepatitis B core antibody IgM, hepatitis B surface antigen, hepatitis C antibody.  Normal vitamin B12 and folate. . Labs September 2021.  B12 788, CRP 5, anti-smooth muscle antibody negative, antimitochondrial antibody negative, vitamin D normal at 34, antigliadin antibody negative, antitissue transglutaminase antibody negative, glucose 426, alkaline phosphatase 219, AST 91, ALT 51, hepatitis serology negative, ferritin 504, hemoglobin 13 . CT a/p wo contrast September 2021.  Prior splenectomy.  Splenosis was noted.  Gastric bypass status.  Nonobstructing kidney stone.  No acute abnormalities identified . Labs August 2021.  AST 74, ALT 47, alkaline phosphatase 194 . Colonoscopy 2021 [facility]. Normal per patient. . EGD  6/1/2021.  Findings included a widely patent gastrojejunostomy, a solitary jejunal erosion with adherent blood just distal of the anastomosis, mild nonerosive corpus gastritis, and normal jejunal limb status post unremarkable random biopsies.  Gastric biopsies were negative for H. pylori.  . Labs 4/14/2021:H/H 10.1/33, MCV 85.3, , WBC 10.5.  Ferritin 26.1.  Normal folate and vitamin B12.  AST 67, ALT 29, , T bili 0.7, CMP otherwise unremarkable aside from glucose 124. . She has a complicated medical history, and unfortunately is a fairly poor historian. Per a consult note from Dr. Bess in 2009, she was hospitalized at [facility] for abdominal pain related to acute, recurrent pancreatitis suspected to be related to familial hyperlipidemia. Her hospital course was complicated by respiratory distress syndrome requiring mechanical ventilation; prior ERCP findings raised concern for possible pancreas divisum. Due to the need for possible surgical intervention, she was transferred to [facility].  . Per the patient, she was hospitalized at [facility] for 7 months, undergoing numerous surgeries. Initially, the head of the pancreas was removed, but symptoms persisted. This led to a splenectomy, and ultimately a total pancreatectomy. 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 Her initial surgeries were performed by Dr. Martin at [facility]; she has since followed with Dr. Buckner and undergone multiple hernia repairs.

## 2023-12-01 ENCOUNTER — WEB ENCOUNTER (OUTPATIENT)
Dept: URBAN - METROPOLITAN AREA CLINIC 113 | Facility: CLINIC | Age: 53
End: 2023-12-01

## 2023-12-04 ENCOUNTER — WEB ENCOUNTER (OUTPATIENT)
Dept: URBAN - METROPOLITAN AREA CLINIC 113 | Facility: CLINIC | Age: 53
End: 2023-12-04

## 2023-12-04 RX ORDER — LINACLOTIDE 290 UG/1
1 CAPSULE AT LEAST 30 MINUTES BEFORE THE FIRST MEAL OF THE DAY ON AN EMPTY STOMACH CAPSULE, GELATIN COATED ORAL ONCE A DAY
Qty: 90 | Refills: 3
End: 2024-11-28

## 2023-12-04 RX ORDER — LACTULOSE 10 G/15ML
60 ML SOLUTION ORAL
Qty: 1620 ML | Refills: 5
Start: 2023-05-04 | End: 2025-05-27

## 2023-12-04 RX ORDER — DICYCLOMINE HYDROCHLORIDE 20 MG/1
1 TABLET TABLET ORAL
Qty: 90 | Refills: 3
End: 2024-11-28

## 2023-12-04 RX ORDER — ONDANSETRON 8 MG/1
DISSOLVE 1 TABLET ON THE TONGUE TABLET, ORALLY DISINTEGRATING ORAL
Qty: 90 | Refills: 3

## 2023-12-04 RX ORDER — PANTOPRAZOLE SODIUM 40 MG/1
TAKE 1 TABLET BY MOUTH DAILY TWICE DAILY TABLET, DELAYED RELEASE ORAL TWICE DAILY
Qty: 180 | Refills: 3

## 2023-12-04 RX ORDER — FAMOTIDINE 40 MG/1
TAKE 1 TABLET BY MOUTH ONCE DAILY AT BEDTIME TABLET, FILM COATED ORAL ONCE A DAY
Qty: 90 | Refills: 3

## 2023-12-04 RX ORDER — PROMETHAZINE HYDROCHLORIDE 12.5 MG/1
1 TABLET AS NEEDED TABLET ORAL
Qty: 45 | Refills: 5
End: 2025-05-27

## 2023-12-04 RX ORDER — PANCRELIPASE 36000; 180000; 114000 [USP'U]/1; [USP'U]/1; [USP'U]/1
3 TABS WITH MEALS, ONE TAB WITH SNACKS CAPSULE, DELAYED RELEASE PELLETS ORAL
Qty: 300 | Refills: 3
Start: 2023-05-04 | End: 2024-11-28

## 2023-12-05 ENCOUNTER — WEB ENCOUNTER (OUTPATIENT)
Dept: URBAN - METROPOLITAN AREA CLINIC 113 | Facility: CLINIC | Age: 53
End: 2023-12-05

## 2023-12-08 ENCOUNTER — TELEPHONE ENCOUNTER (OUTPATIENT)
Dept: URBAN - METROPOLITAN AREA CLINIC 113 | Facility: CLINIC | Age: 53
End: 2023-12-08

## 2023-12-08 RX ORDER — ONDANSETRON 2 MG/ML
8MG INJECTION, SOLUTION INTRAMUSCULAR; INTRAVENOUS
Qty: 4 MILLILITER | Refills: 5 | OUTPATIENT
Start: 2023-12-11

## 2024-02-26 ENCOUNTER — LAB (OUTPATIENT)
Dept: URBAN - METROPOLITAN AREA CLINIC 113 | Facility: CLINIC | Age: 54
End: 2024-02-26

## 2024-03-12 ENCOUNTER — OV EP (OUTPATIENT)
Dept: URBAN - METROPOLITAN AREA CLINIC 107 | Facility: CLINIC | Age: 54
End: 2024-03-12
Payer: MEDICARE

## 2024-03-12 VITALS
BODY MASS INDEX: 28.08 KG/M2 | TEMPERATURE: 97.3 F | HEIGHT: 62 IN | HEART RATE: 71 BPM | SYSTOLIC BLOOD PRESSURE: 193 MMHG | DIASTOLIC BLOOD PRESSURE: 86 MMHG | WEIGHT: 152.6 LBS

## 2024-03-12 DIAGNOSIS — K21.9 GERD: ICD-10-CM

## 2024-03-12 DIAGNOSIS — R10.13 EPIGASTRIC ABDOMINAL PAIN: ICD-10-CM

## 2024-03-12 DIAGNOSIS — K74.60 CIRRHOSIS: ICD-10-CM

## 2024-03-12 DIAGNOSIS — K59.01 CONSTIPATION: ICD-10-CM

## 2024-03-12 PROCEDURE — 99214 OFFICE O/P EST MOD 30 MIN: CPT | Performed by: NURSE PRACTITIONER

## 2024-03-12 RX ORDER — DICYCLOMINE HYDROCHLORIDE 20 MG/1
1 TABLET TABLET ORAL
Qty: 90 | Refills: 3 | Status: ACTIVE | COMMUNITY
End: 2024-11-28

## 2024-03-12 RX ORDER — RIFAXIMIN 550 MG/1
1 TABLET TABLET ORAL TWICE A DAY
Status: ACTIVE | COMMUNITY

## 2024-03-12 RX ORDER — ONDANSETRON 8 MG/1
DISSOLVE 1 TABLET ON THE TONGUE TABLET, ORALLY DISINTEGRATING ORAL
Qty: 90 | Refills: 3 | Status: ACTIVE | COMMUNITY

## 2024-03-12 RX ORDER — MIRABEGRON 25 MG/1
1 TABLET TABLET, FILM COATED, EXTENDED RELEASE ORAL ONCE A DAY
Status: ON HOLD | COMMUNITY

## 2024-03-12 RX ORDER — PANCRELIPASE 36000; 180000; 114000 [USP'U]/1; [USP'U]/1; [USP'U]/1
TWO TABLETS CAPSULE, DELAYED RELEASE PELLETS ORAL
Qty: 540 | Refills: 3 | Status: ON HOLD | COMMUNITY

## 2024-03-12 RX ORDER — ONDANSETRON 2 MG/ML
8MG INJECTION, SOLUTION INTRAMUSCULAR; INTRAVENOUS
Qty: 4 MILLILITER | Refills: 5 | Status: ACTIVE | COMMUNITY
Start: 2023-12-11

## 2024-03-12 RX ORDER — LACTULOSE 10 G/15ML
60 ML SOLUTION ORAL
Qty: 1620 ML | Refills: 5 | Status: ACTIVE | COMMUNITY
Start: 2023-05-04 | End: 2025-05-27

## 2024-03-12 RX ORDER — FAMOTIDINE 40 MG/1
TAKE 1 TABLET BY MOUTH ONCE DAILY AT BEDTIME TABLET, FILM COATED ORAL ONCE A DAY
Qty: 90 | Refills: 3 | Status: ACTIVE | COMMUNITY

## 2024-03-12 RX ORDER — TRAZODONE HYDROCHLORIDE 150 MG/1
1 TABLET AT BEDTIME TABLET ORAL ONCE A DAY
Status: ACTIVE | COMMUNITY

## 2024-03-12 RX ORDER — ROSUVASTATIN CALCIUM 5 MG/1
1 TABLET TABLET, FILM COATED ORAL ONCE A DAY
Status: ACTIVE | COMMUNITY

## 2024-03-12 RX ORDER — LACTULOSE 10 G/15ML
TAKE 60ML BY MOUTH 3 TIMES DAILY SOLUTION ORAL; RECTAL
Qty: 8514 MILLILITER | Refills: 6 | Status: ON HOLD | COMMUNITY

## 2024-03-12 RX ORDER — MULTIVIT-MIN/FA/LYCOPEN/LUTEIN .4-300-25
AS DIRECTED TABLET ORAL
Status: ON HOLD | COMMUNITY

## 2024-03-12 RX ORDER — HYDROCHLOROTHIAZIDE 25 MG/1
1 TABLET IN THE MORNING TABLET ORAL ONCE A DAY
Status: ACTIVE | COMMUNITY

## 2024-03-12 RX ORDER — PANTOPRAZOLE SODIUM 40 MG/1
TAKE 1 TABLET BY MOUTH DAILY TWICE DAILY TABLET, DELAYED RELEASE ORAL TWICE DAILY
Qty: 180 | Refills: 3 | Status: ACTIVE | COMMUNITY

## 2024-03-12 RX ORDER — FENOFIBRATE 200 MG/1
1 CAPSULE WITH A MEAL CAPSULE ORAL ONCE A DAY
Status: ACTIVE | COMMUNITY

## 2024-03-12 RX ORDER — PROMETHAZINE HYDROCHLORIDE 12.5 MG/1
1 TABLET AS NEEDED TABLET ORAL
Qty: 135 | Refills: 5 | Status: ACTIVE | COMMUNITY

## 2024-03-12 RX ORDER — TELMISARTAN 80 MG/1
1 TABLET TABLET ORAL ONCE A DAY
Status: ACTIVE | COMMUNITY

## 2024-03-12 RX ORDER — INSULIN DEGLUDEC INJECTION 200 U/ML
AS DIRECTED INJECTION, SOLUTION SUBCUTANEOUS
Status: ACTIVE | COMMUNITY

## 2024-03-12 RX ORDER — PANCRELIPASE 36000; 180000; 114000 [USP'U]/1; [USP'U]/1; [USP'U]/1
3 TABS WITH MEALS, ONE TAB WITH SNACKS CAPSULE, DELAYED RELEASE PELLETS ORAL
Qty: 300 | Refills: 3

## 2024-03-12 RX ORDER — DESVENLAFAXINE 100 MG/1
1 TABLET TABLET, EXTENDED RELEASE ORAL ONCE A DAY
Status: ACTIVE | COMMUNITY

## 2024-03-12 RX ORDER — LINACLOTIDE 290 UG/1
1 CAPSULE AT LEAST 30 MINUTES BEFORE THE FIRST MEAL OF THE DAY ON AN EMPTY STOMACH CAPSULE, GELATIN COATED ORAL ONCE A DAY
Qty: 90 | Refills: 3 | Status: ACTIVE | COMMUNITY
End: 2024-11-28

## 2024-03-12 RX ORDER — PANCRELIPASE 36000; 180000; 114000 [USP'U]/1; [USP'U]/1; [USP'U]/1
3 TABS WITH MEALS, ONE TAB WITH SNACKS CAPSULE, DELAYED RELEASE PELLETS ORAL
Qty: 300 | Refills: 3 | Status: ON HOLD | COMMUNITY
Start: 2023-05-04 | End: 2024-11-28

## 2024-03-12 RX ORDER — VIBEGRON 75 MG/1
1 TABLET TABLET, FILM COATED ORAL ONCE A DAY
Status: ACTIVE | COMMUNITY

## 2024-03-12 RX ORDER — RIFAXIMIN 550 MG/1
1 TABLET TABLET ORAL TWICE A DAY
Qty: 180 | Refills: 3

## 2024-03-12 NOTE — HPI-TODAY'S VISIT:
53-year-old female with an extensive medical history to include febrile pancreatitis with SIRS and questionable pancreas divisum s/p total pancreatectomy, splenectomy, and cholecystectomy (2009, Dr. Niko Martin at [facility] [place] cirrhosis related to metabolic associated steatosis, multiple abdominal hernia repairs, presenting for a hospital follow-up regarding gastroparesis.   . She did see Dr. Link.  He recommended that she not have surgery given her situation.  He felt the risk was extremely high.  He agreed with the previous decision to hold off on surgery in December 2022. . She has a history of bladder distention.  Likely a neurogenic bladder.  Associated with diabetes.  She is followed in Dr. Cuello's office. . Last seen 11/28/2023 for hepatic encephalopathy secondary to cirrhosis related to metabolic associated steatosis.  She is on lactulose and rifaximin.  She was getting dehydrated with the lactulose receiving weekly IV fluids.  She ended up receiving a port to get weekly fluids. Multipl.e medications were refilled including Creon, promethazine, lactulose, dicyclomine, odansetron, pantoprazole, famotidine, Linzess 290 mcg. . Has standing order in infusion center for IV Zofran 8 mg and IV fluids for 6 months. .   Labs reviewed by Dr. Cordova in February hemoglobin noted to be chronically low. CT abdomen and pelvis scheduled for 3/18/2024.  Per PCP note labs, ammonia level were all unremarkable. . Labs 2/27/2024.  CMP: Glucose 128, creatinine 0.6, sodium 140, total bilirubin 0.7, alk phos 141, AST 54.  CBC: Hgb 9.1, HCT 29.5, RBC 3.37, MCV 87 and platelet normal at 174.  She is here with her daughter today. Recently in the hospital [facility] 3/7 with stroke symptoms and gastroparesis. Had the CT scan of abdomen there, didn't see a blockage, was told symptoms were due to gastroparesis. Had MRI of her head.  For her hepatic encephalopathy she is on lactulose 65 ml TID and xifanax. For her constipationt she is on Linzess 290. States her hemoglogin was low, has nosebleeds.  No melena or hematochezia. She states her pain started right side of abdomen and goes around to the left side of her abdomen. Since discharge she has sarted eating and is able to tolerate fluids.  Last BM was this morning at 0900. No melena or hematochezia.  Getting fluids weekly every Friday at [facility] 1 bag. They want to know if she should get fluids every other week. Weight is down 5 pounds from her last appointment.

## 2024-03-12 NOTE — HPI-OTHER HISTORIES
Colonoscopy September 2023. Moderately redundant colon notable for looping. Normal colonic mucosa. Moderate external hemorrhoids with some degree of prolapse. Random biopsies were notable for normal colonic mucosa. No evidence for microscopic colitis.  September 2023. Alpha-fetoprotein 4.9  Labs August 2023. AST 56, ALT 27. Alkaline phosphatase 175. Total bilirubin 1.0. INR 1.4, hemoglobin 9.5. Platelet count 155,000. . CT scan abdomen pelvis with IV contrast.  March 2023. Moderate stool throughout the colon.  Small ventral hernia.  Cirrhosis.  Small amount of ascites.  Multiple splenules.  No acute process identified. . Colonoscopy February 2023.  Markedly redundant colon notable for considerable looping.  2 mm ascending colonic erosion.  A sending descending and sigmoid colon was biopsied.  Preparation was fair.  Biopsies were notable for no significant abnormalities.  Normal. . Labs January 2023.  Hemoglobin 10.3.  MCV 90.  Platelet count 203K.  INR 1.5.  Creatinine 0.7.  AST 57, ALT 29.  Alkaline phosphatase 221.  Total bilirubin 0.8. . CT scan abdomen pelvis January 2023.  Hepatomegaly noted.  Postsurgical changes of the bowel identified.  Urinary bladder was markedly distended.  Right-sided nonobstructing kidney stones identified.  Evidence of previous cholecystectomy pancreatectomy and splenectomy noted. . Gastrografin enema.  December 2022.  No evidence for bowel obstruction or volvulus. . Colonoscopy December 2022.  Dr. Villagran.  Poor prep.  Discontinuous areas of ulcerative mucosa in the sigmoid colon.  The lumen of the sigmoid colon was significantly dilated. . KUB December 2022.  Dilated loops of colon within the mid and upper abdomen suggesting possible obstruction versus ileus. . CT abdomen pelvis December 14, 2022.  Obstruction within the distal sigmoid colon with proximal dilation of the colon and majority of the small bowel.  Decompressed and thickened distal sigmoid colon and upper rectum. . Ultrasound November 2022.  Surgically absent gallbladder.  4 mm common bile duct.  Spleen is surgically absent.  Splenosis noted.  Cirrhotic liver.  No mass was noted. . Abdominal ultrasound November 2022.  Negative for significant ascites.  Consistent with cirrhosis. . MRI of the abdomen with and without contrast 7/24/2022 showed cirrhosis with a small amount of ascites and intrahepatic lesions consistent with splenosis. . Abdominal ultrasound 7/22/2022 showed cirrhotic hepatic morphology with a 1.7 cm hypoechoic mass in the left hepatic lobe . CT of the abdomen and pelvis without contrast 7/21/2022 showed multiple stable hyperattenuating lesions along the liver capsule, largest measuring 1.9 cm, consistent with spondylosis.  Scant perihepatic ascites.  Atrophic pancreas with postsurgical changes/clips.  Status post cholecystectomy and splenectomy with multiple splenules noted.  Postsurgical changes of the stomach and small bowel consistent with gastric bypass.  Unchanged stranding within the mesentery and retroperitoneum.  No acute process. . Labs 7/1/2022:H/H11.5/34.9, MCV 98.6, , WBC 8.6.  AST 74, ALT 27, , T bili 1.4, albumin 4, total protein 8.2, CMP otherwise unremarkable aside from glucose 153.  Negative hepatitis A antibody IgM, hepatitis B core antibody IgM, hepatitis B surface antigen, hepatitis C antibody.  Normal vitamin B12 and folate. . Labs September 2021.  B12 788, CRP 5, anti-smooth muscle antibody negative, antimitochondrial antibody negative, vitamin D normal at 34, antigliadin antibody negative, antitissue transglutaminase antibody negative, glucose 426, alkaline phosphatase 219, AST 91, ALT 51, hepatitis serology negative, ferritin 504, hemoglobin 13 . CT a/p wo contrast September 2021.  Prior splenectomy.  Splenosis was noted.  Gastric bypass status.  Nonobstructing kidney stone.  No acute abnormalities identified . Labs August 2021.  AST 74, ALT 47, alkaline phosphatase 194 . Colonoscopy 2021 [facility]. Normal per patient. . EGD  6/1/2021.  Findings included a widely patent gastrojejunostomy, a solitary jejunal erosion with adherent blood just distal of the anastomosis, mild nonerosive corpus gastritis, and normal jejunal limb status post unremarkable random biopsies.  Gastric biopsies were negative for H. pylori.  . Labs 4/14/2021:H/H 10.1/33, MCV 85.3, , WBC 10.5.  Ferritin 26.1.  Normal folate and vitamin B12.  AST 67, ALT 29, , T bili 0.7, CMP otherwise unremarkable aside from glucose 124. . She has a complicated medical history, and unfortunately is a fairly poor historian. Per a consult note from Dr. Bess in 2009, she was hospitalized at [facility] for abdominal pain related to acute, recurrent pancreatitis suspected to be related to familial hyperlipidemia. Her hospital course was complicated by respiratory distress syndrome requiring mechanical ventilation; prior ERCP findings raised concern for possible pancreas divisum. Due to the need for possible surgical intervention, she was transferred to [facility] [place].  . Per the patient, she was hospitalized at [facility] for 7 months, undergoing numerous surgeries. Initially, the head of the pancreas was removed, but symptoms persisted. This led to a splenectomy, and ultimately a total pancreatectomy. 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 Her initial surgeries were performed by Dr. Martin at [facility] [place]; she has since followed with Dr. Buckner and undergone multiple hernia repairs.

## 2024-05-07 ENCOUNTER — OFFICE VISIT (OUTPATIENT)
Dept: URBAN - METROPOLITAN AREA CLINIC 113 | Facility: CLINIC | Age: 54
End: 2024-05-07
Payer: MEDICARE

## 2024-05-07 ENCOUNTER — DASHBOARD ENCOUNTERS (OUTPATIENT)
Age: 54
End: 2024-05-07

## 2024-05-07 VITALS
TEMPERATURE: 97.9 F | BODY MASS INDEX: 25.8 KG/M2 | DIASTOLIC BLOOD PRESSURE: 72 MMHG | HEART RATE: 73 BPM | WEIGHT: 140.2 LBS | HEIGHT: 62 IN | SYSTOLIC BLOOD PRESSURE: 134 MMHG

## 2024-05-07 DIAGNOSIS — K76.0 HEPATIC STEATOSIS: ICD-10-CM

## 2024-05-07 DIAGNOSIS — E08.65 DIABETES MELLITUS DUE TO UNDERLYING CONDITION WITH HYPERGLYCEMIA: ICD-10-CM

## 2024-05-07 DIAGNOSIS — K74.60 CIRRHOSIS: ICD-10-CM

## 2024-05-07 DIAGNOSIS — R10.13 EPIGASTRIC ABDOMINAL PAIN: ICD-10-CM

## 2024-05-07 DIAGNOSIS — K43.9 VENTRAL HERNIA: ICD-10-CM

## 2024-05-07 DIAGNOSIS — R18.8 OTHER ASCITES: ICD-10-CM

## 2024-05-07 DIAGNOSIS — K74.69 CIRRHOSIS, CRYPTOGENIC: ICD-10-CM

## 2024-05-07 DIAGNOSIS — K59.01 CONSTIPATION: ICD-10-CM

## 2024-05-07 DIAGNOSIS — D50.9 IRON (FE) DEFICIENCY ANEMIA: ICD-10-CM

## 2024-05-07 DIAGNOSIS — R74.8 ELEVATED LIVER ENZYMES: ICD-10-CM

## 2024-05-07 DIAGNOSIS — N32.89 BLADDER DISTENTION: ICD-10-CM

## 2024-05-07 DIAGNOSIS — K63.89 COLON DISTENTION: ICD-10-CM

## 2024-05-07 DIAGNOSIS — K76.9 LIVER LESION: ICD-10-CM

## 2024-05-07 DIAGNOSIS — K21.9 GERD: ICD-10-CM

## 2024-05-07 DIAGNOSIS — K31.84 GASTROPARESIS: ICD-10-CM

## 2024-05-07 PROCEDURE — 99214 OFFICE O/P EST MOD 30 MIN: CPT | Performed by: INTERNAL MEDICINE

## 2024-05-07 RX ORDER — PANTOPRAZOLE 40 MG/1
TAKE ONE TABLET BY MOUTH TWO (2) TIMES A DAY TABLET, DELAYED RELEASE ORAL
Qty: 180 TABLET | Refills: 3 | Status: ACTIVE | COMMUNITY

## 2024-05-07 RX ORDER — INSULIN DEGLUDEC INJECTION 200 U/ML
AS DIRECTED INJECTION, SOLUTION SUBCUTANEOUS
Status: ACTIVE | COMMUNITY

## 2024-05-07 RX ORDER — ROSUVASTATIN 5 MG/1
1 TABLET TABLET, FILM COATED ORAL ONCE A DAY
Status: ACTIVE | COMMUNITY

## 2024-05-07 RX ORDER — TELMISARTAN 80 MG/1
1 TABLET TABLET ORAL ONCE A DAY
Status: ACTIVE | COMMUNITY

## 2024-05-07 RX ORDER — PROMETHAZINE HYDROCHLORIDE 12.5 MG/1
1 TABLET AS NEEDED TABLET ORAL
Qty: 135 | Refills: 5 | Status: ACTIVE | COMMUNITY

## 2024-05-07 RX ORDER — DICYCLOMINE HYDROCHLORIDE 20 MG/1
1 TABLET TABLET ORAL
Qty: 90 | Refills: 3 | Status: ACTIVE | COMMUNITY
End: 2024-11-28

## 2024-05-07 RX ORDER — ONDANSETRON 8 MG/1
DISSOLVE 1 TABLET ON THE TONGUE TABLET, ORALLY DISINTEGRATING ORAL
Qty: 90 | Refills: 3 | Status: ACTIVE | COMMUNITY

## 2024-05-07 RX ORDER — PANCRELIPASE 36000; 180000; 114000 [USP'U]/1; [USP'U]/1; [USP'U]/1
3 TABS WITH MEALS, ONE TAB WITH SNACKS CAPSULE, DELAYED RELEASE PELLETS ORAL
Qty: 300 | Refills: 3 | Status: ACTIVE | COMMUNITY

## 2024-05-07 RX ORDER — ONDANSETRON 8 MG/1
1 TABLET ON THE TONGUE AND ALLOW TO DISSOLVE TABLET, ORALLY DISINTEGRATING ORAL
Qty: 60 | Refills: 11 | OUTPATIENT
Start: 2024-05-07

## 2024-05-07 RX ORDER — VIBEGRON 75 MG/1
1 TABLET TABLET, FILM COATED ORAL ONCE A DAY
Status: ACTIVE | COMMUNITY

## 2024-05-07 RX ORDER — HYDROCHLOROTHIAZIDE 25 MG/1
1 TABLET IN THE MORNING TABLET ORAL ONCE A DAY
Status: ACTIVE | COMMUNITY

## 2024-05-07 RX ORDER — TRAZODONE HYDROCHLORIDE 150 MG/1
1 TABLET AT BEDTIME TABLET ORAL ONCE A DAY
Status: ACTIVE | COMMUNITY

## 2024-05-07 RX ORDER — DESVENLAFAXINE 100 MG/1
1 TABLET TABLET, EXTENDED RELEASE ORAL ONCE A DAY
Status: ACTIVE | COMMUNITY

## 2024-05-07 RX ORDER — LACTULOSE 10 G/15ML
60 ML SOLUTION ORAL
Qty: 1620 ML | Refills: 5 | Status: ACTIVE | COMMUNITY
Start: 2023-05-04 | End: 2025-05-27

## 2024-05-07 RX ORDER — LINACLOTIDE 290 UG/1
1 CAPSULE AT LEAST 30 MINUTES BEFORE THE FIRST MEAL OF THE DAY ON AN EMPTY STOMACH CAPSULE, GELATIN COATED ORAL ONCE A DAY
Qty: 90 | Refills: 3 | Status: ACTIVE | COMMUNITY
End: 2024-11-28

## 2024-05-07 RX ORDER — FAMOTIDINE 40 MG/1
TAKE 1 TABLET BY MOUTH ONCE DAILY AT BEDTIME TABLET, FILM COATED ORAL ONCE A DAY
Qty: 90 | Refills: 3 | Status: ACTIVE | COMMUNITY

## 2024-05-07 RX ORDER — FENOFIBRATE 200 MG/1
1 CAPSULE WITH A MEAL CAPSULE ORAL ONCE A DAY
Status: ACTIVE | COMMUNITY

## 2024-05-07 RX ORDER — LACTULOSE 10 G/15ML
60 ML SOLUTION ORAL TWICE DAILY
Qty: 3600 MILLILITER | Refills: 11 | OUTPATIENT
Start: 2024-05-07 | End: 2025-05-02

## 2024-05-07 RX ORDER — RIFAXIMIN 550 MG/1
1 TABLET TABLET ORAL TWICE A DAY
Qty: 180 | Refills: 3 | Status: ACTIVE | COMMUNITY

## 2024-05-26 ENCOUNTER — WEB ENCOUNTER (OUTPATIENT)
Dept: URBAN - METROPOLITAN AREA CLINIC 113 | Facility: CLINIC | Age: 54
End: 2024-05-26

## 2024-05-30 ENCOUNTER — WEB ENCOUNTER (OUTPATIENT)
Dept: URBAN - METROPOLITAN AREA CLINIC 113 | Facility: CLINIC | Age: 54
End: 2024-05-30

## 2024-05-30 ENCOUNTER — LAB OUTSIDE AN ENCOUNTER (OUTPATIENT)
Dept: URBAN - METROPOLITAN AREA CLINIC 113 | Facility: CLINIC | Age: 54
End: 2024-05-30

## 2024-05-30 ENCOUNTER — TELEPHONE ENCOUNTER (OUTPATIENT)
Dept: URBAN - METROPOLITAN AREA CLINIC 113 | Facility: CLINIC | Age: 54
End: 2024-05-30

## 2024-06-03 ENCOUNTER — ERX REFILL RESPONSE (OUTPATIENT)
Dept: URBAN - METROPOLITAN AREA CLINIC 72 | Facility: CLINIC | Age: 54
End: 2024-06-03

## 2024-06-03 RX ORDER — PANCRELIPASE 36000; 180000; 114000 [USP'U]/1; [USP'U]/1; [USP'U]/1
TAKE 3 CAPSULES BY MOUTH 3 TIMES DAILY WITH MEALS AND 1 CAPSULE  WITH SNACKS, MAX 11 CAPSULES PER DAY CAPSULE, DELAYED RELEASE PELLETS ORAL
Qty: 1100 CAPSULE | Refills: 2 | OUTPATIENT

## 2024-06-03 RX ORDER — PANCRELIPASE 36000; 180000; 114000 [USP'U]/1; [USP'U]/1; [USP'U]/1
3 TABS WITH MEALS, ONE TAB WITH SNACKS CAPSULE, DELAYED RELEASE PELLETS ORAL
Qty: 300 | Refills: 3 | OUTPATIENT

## 2024-06-07 ENCOUNTER — TELEPHONE ENCOUNTER (OUTPATIENT)
Dept: URBAN - METROPOLITAN AREA CLINIC 113 | Facility: CLINIC | Age: 54
End: 2024-06-07

## 2024-06-07 RX ORDER — HYOSCYAMINE SULFATE 0.12 MG/1
1-2 TABLETS UNDER THE TONGUE AND ALLOW TO DISSOLVE TABLET, ORALLY DISINTEGRATING ORAL
Qty: 60 | Refills: 5 | OUTPATIENT
Start: 2024-06-07 | End: 2024-12-03

## 2024-06-11 ENCOUNTER — LAB OUTSIDE AN ENCOUNTER (OUTPATIENT)
Dept: URBAN - METROPOLITAN AREA CLINIC 113 | Facility: CLINIC | Age: 54
End: 2024-06-11

## 2024-06-11 ENCOUNTER — OFFICE VISIT (OUTPATIENT)
Dept: URBAN - METROPOLITAN AREA CLINIC 113 | Facility: CLINIC | Age: 54
End: 2024-06-11

## 2024-06-11 ENCOUNTER — OFFICE VISIT (OUTPATIENT)
Dept: URBAN - METROPOLITAN AREA CLINIC 113 | Facility: CLINIC | Age: 54
End: 2024-06-11
Payer: MEDICARE

## 2024-06-11 VITALS
HEART RATE: 95 BPM | RESPIRATION RATE: 18 BRPM | DIASTOLIC BLOOD PRESSURE: 74 MMHG | SYSTOLIC BLOOD PRESSURE: 159 MMHG | TEMPERATURE: 97.3 F | HEIGHT: 62 IN | WEIGHT: 166 LBS | BODY MASS INDEX: 30.55 KG/M2

## 2024-06-11 DIAGNOSIS — R60.0 LOWER EXTREMITY EDEMA: ICD-10-CM

## 2024-06-11 DIAGNOSIS — K76.9 LIVER LESION: ICD-10-CM

## 2024-06-11 DIAGNOSIS — K76.0 HEPATIC STEATOSIS: ICD-10-CM

## 2024-06-11 DIAGNOSIS — R18.8 OTHER ASCITES: ICD-10-CM

## 2024-06-11 DIAGNOSIS — K74.60 CIRRHOSIS: ICD-10-CM

## 2024-06-11 PROCEDURE — 99214 OFFICE O/P EST MOD 30 MIN: CPT | Performed by: NURSE PRACTITIONER

## 2024-06-11 RX ORDER — PANTOPRAZOLE 40 MG/1
TAKE ONE TABLET BY MOUTH TWO (2) TIMES A DAY TABLET, DELAYED RELEASE ORAL
Qty: 180 TABLET | Refills: 3 | Status: ACTIVE | COMMUNITY

## 2024-06-11 RX ORDER — TRAZODONE HYDROCHLORIDE 150 MG/1
1 TABLET AT BEDTIME TABLET ORAL ONCE A DAY
Status: ACTIVE | COMMUNITY

## 2024-06-11 RX ORDER — SPIRONOLACTONE 25 MG/1
1 TABLET TABLET, FILM COATED ORAL TWICE DAILY
Status: ACTIVE | COMMUNITY

## 2024-06-11 RX ORDER — ONDANSETRON 8 MG/1
1 TABLET ON THE TONGUE AND ALLOW TO DISSOLVE TABLET, ORALLY DISINTEGRATING ORAL
Qty: 60 | Refills: 11 | Status: ACTIVE | COMMUNITY
Start: 2024-05-07

## 2024-06-11 RX ORDER — DICYCLOMINE HYDROCHLORIDE 20 MG/1
1 TABLET TABLET ORAL
Qty: 90 | Refills: 3 | Status: ACTIVE | COMMUNITY
End: 2024-11-28

## 2024-06-11 RX ORDER — ROSUVASTATIN 5 MG/1
1 TABLET TABLET, FILM COATED ORAL ONCE A DAY
Status: ACTIVE | COMMUNITY

## 2024-06-11 RX ORDER — SPIRONOLACTONE 25 MG/1
3 TABLETS TABLET, FILM COATED ORAL ONCE A DAY
Qty: 90 TABLET | Refills: 2 | OUTPATIENT
Start: 2024-06-11 | End: 2024-09-09

## 2024-06-11 RX ORDER — LACTULOSE 10 G/15ML
60 ML SOLUTION ORAL TWICE DAILY
Qty: 3600 MILLILITER | Refills: 11 | Status: ACTIVE | COMMUNITY
Start: 2024-05-07 | End: 2025-05-02

## 2024-06-11 RX ORDER — INSULIN DEGLUDEC INJECTION 200 U/ML
AS DIRECTED INJECTION, SOLUTION SUBCUTANEOUS
Status: ACTIVE | COMMUNITY

## 2024-06-11 RX ORDER — VIBEGRON 75 MG/1
1 TABLET TABLET, FILM COATED ORAL ONCE A DAY
Status: ACTIVE | COMMUNITY

## 2024-06-11 RX ORDER — PROMETHAZINE HYDROCHLORIDE 12.5 MG/1
1 TABLET AS NEEDED TABLET ORAL
Qty: 135 | Refills: 5 | Status: ACTIVE | COMMUNITY

## 2024-06-11 RX ORDER — HYOSCYAMINE SULFATE 0.12 MG/1
1-2 TABLETS UNDER THE TONGUE AND ALLOW TO DISSOLVE TABLET, ORALLY DISINTEGRATING ORAL
Qty: 60 | Refills: 5 | Status: ACTIVE | COMMUNITY
Start: 2024-06-07 | End: 2024-12-03

## 2024-06-11 RX ORDER — LACTULOSE 10 G/15ML
60 ML SOLUTION ORAL
Qty: 1620 ML | Refills: 5 | Status: ACTIVE | COMMUNITY
Start: 2023-05-04 | End: 2025-05-27

## 2024-06-11 RX ORDER — DESVENLAFAXINE 100 MG/1
1 TABLET TABLET, EXTENDED RELEASE ORAL ONCE A DAY
Status: ACTIVE | COMMUNITY

## 2024-06-11 RX ORDER — LINACLOTIDE 290 UG/1
1 CAPSULE AT LEAST 30 MINUTES BEFORE THE FIRST MEAL OF THE DAY ON AN EMPTY STOMACH CAPSULE, GELATIN COATED ORAL ONCE A DAY
Qty: 90 | Refills: 3 | Status: ACTIVE | COMMUNITY
End: 2024-11-28

## 2024-06-11 RX ORDER — HYDROCHLOROTHIAZIDE 25 MG/1
1 TABLET IN THE MORNING TABLET ORAL ONCE A DAY
Status: ACTIVE | COMMUNITY

## 2024-06-11 RX ORDER — RIFAXIMIN 550 MG/1
1 TABLET TABLET ORAL TWICE A DAY
Qty: 180 | Refills: 3 | Status: ACTIVE | COMMUNITY

## 2024-06-11 RX ORDER — FENOFIBRATE 200 MG/1
1 CAPSULE WITH A MEAL CAPSULE ORAL ONCE A DAY
Status: ACTIVE | COMMUNITY

## 2024-06-11 RX ORDER — FUROSEMIDE 40 MG/1
1 TABLET TABLET ORAL ONCE A DAY
Qty: 30 TABLET | Refills: 2 | OUTPATIENT
Start: 2024-06-11 | End: 2024-09-09

## 2024-06-11 RX ORDER — FAMOTIDINE 40 MG/1
TAKE 1 TABLET BY MOUTH ONCE DAILY AT BEDTIME TABLET, FILM COATED ORAL ONCE A DAY
Qty: 90 | Refills: 3 | Status: ACTIVE | COMMUNITY

## 2024-06-11 RX ORDER — TELMISARTAN 80 MG/1
1 TABLET TABLET ORAL ONCE A DAY
Status: ACTIVE | COMMUNITY

## 2024-06-11 RX ORDER — ONDANSETRON 8 MG/1
DISSOLVE 1 TABLET ON THE TONGUE TABLET, ORALLY DISINTEGRATING ORAL
Qty: 90 | Refills: 3 | Status: ACTIVE | COMMUNITY

## 2024-06-11 RX ORDER — PANCRELIPASE 36000; 180000; 114000 [USP'U]/1; [USP'U]/1; [USP'U]/1
TAKE 3 CAPSULES BY MOUTH 3 TIMES DAILY WITH MEALS AND 1 CAPSULE  WITH SNACKS, MAX 11 CAPSULES PER DAY CAPSULE, DELAYED RELEASE PELLETS ORAL
Qty: 1100 CAPSULE | Refills: 2 | Status: ACTIVE | COMMUNITY

## 2024-06-11 NOTE — HPI-OTHER HISTORIES
CT abdomen and pelvis scheduled for 5/22/2024.  Labs. April 26, 2024. Hemoglobin 9.0. MCV 85. Platelet count 179,000. AST 54, ALT 25. Ferritin 12. Iron saturation 34%.  March 2024. Labs. Iron saturation 5%. Ferritin 14. Hemoglobin 8.5. AST 3017, ALT 16  She saw Dr. Link. He recommended that she not have surgery given her situation. He felt the risk was extremely high. He agreed with the previous decision to hold off on surgery in December 2022.  She has a history of bladder distention. Likely a neurogenic bladder. Associated with diabetes. She is followed in Dr. Cuello's office.  Labs 2/27/2024. CMP: Glucose 128, creatinine 0.6, sodium 140, total bilirubin 0.7, alk phos 141, AST 54. CBC: Hgb 9.1, HCT 29.5, RBC 3.37, MCV 87 and platelet normal at 174.  Colonoscopy September 2023. Moderately redundant colon notable for looping. Normal colonic mucosa. Moderate external hemorrhoids with some degree of prolapse. Random biopsies were notable for normal colonic mucosa. No evidence for microscopic colitis.  September 2023. Alpha-fetoprotein 4.9  Labs August 2023. AST 56, ALT 27. Alkaline phosphatase 175. Total bilirubin 1.0. INR 1.4, hemoglobin 9.5. Platelet count 155,000.  CT scan abdomen pelvis with IV contrast.  March 2023. Moderate stool throughout the colon.  Small ventral hernia.  Cirrhosis.  Small amount of ascites.  Multiple splenules.  No acute process identified.  Colonoscopy February 2023.  Markedly redundant colon notable for considerable looping.  2 mm ascending colonic erosion.  A sending descending and sigmoid colon was biopsied.  Preparation was fair.  Biopsies were notable for no significant abnormalities.  Normal.  Labs January 2023.  Hemoglobin 10.3.  MCV 90.  Platelet count 203K.  INR 1.5.  Creatinine 0.7.  AST 57, ALT 29.  Alkaline phosphatase 221.  Total bilirubin 0.8.  CT scan abdomen pelvis January 2023.  Hepatomegaly noted.  Postsurgical changes of the bowel identified.  Urinary bladder was markedly distended.  Right-sided nonobstructing kidney stones identified.  Evidence of previous cholecystectomy pancreatectomy and splenectomy noted.  Gastrografin enema.  December 2022.  No evidence for bowel obstruction or volvulus.  Colonoscopy December 2022.  Dr. Villagran.  Poor prep.  Discontinuous areas of ulcerative mucosa in the sigmoid colon.  The lumen of the sigmoid colon was significantly dilated.  KUB December 2022.  Dilated loops of colon within the mid and upper abdomen suggesting possible obstruction versus ileus.  CT abdomen pelvis December 14, 2022.  Obstruction within the distal sigmoid colon with proximal dilation of the colon and majority of the small bowel.  Decompressed and thickened distal sigmoid colon and upper rectum.  Ultrasound November 2022.  Surgically absent gallbladder.  4 mm common bile duct.  Spleen is surgically absent.  Splenosis noted.  Cirrhotic liver.  No mass was noted.  Abdominal ultrasound November 2022.  Negative for significant ascites.  Consistent with cirrhosis.  MRI of the abdomen with and without contrast 7/24/2022 showed cirrhosis with a small amount of ascites and intrahepatic lesions consistent with splenosis.  Abdominal ultrasound 7/22/2022 showed cirrhotic hepatic morphology with a 1.7 cm hypoechoic mass in the left hepatic lobe  CT of the abdomen and pelvis without contrast 7/21/2022 showed multiple stable hyperattenuating lesions along the liver capsule, largest measuring 1.9 cm, consistent with spondylosis.  Scant perihepatic ascites.  Atrophic pancreas with postsurgical changes/clips.  Status post cholecystectomy and splenectomy with multiple splenules noted.  Postsurgical changes of the stomach and small bowel consistent with gastric bypass.  Unchanged stranding within the mesentery and retroperitoneum.  No acute process.  Labs 7/1/2022:H/H11.5/34.9, MCV 98.6, , WBC 8.6.  AST 74, ALT 27, , T bili 1.4, albumin 4, total protein 8.2, CMP otherwise unremarkable aside from glucose 153.  Negative hepatitis A antibody IgM, hepatitis B core antibody IgM, hepatitis B surface antigen, hepatitis C antibody.  Normal vitamin B12 and folate.  Labs September 2021.  B12 788, CRP 5, anti-smooth muscle antibody negative, antimitochondrial antibody negative, vitamin D normal at 34, antigliadin antibody negative, antitissue transglutaminase antibody negative, glucose 426, alkaline phosphatase 219, AST 91, ALT 51, hepatitis serology negative, ferritin 504, hemoglobin 13  CT a/p wo contrast September 2021.  Prior splenectomy.  Splenosis was noted.  Gastric bypass status.  Nonobstructing kidney stone.  No acute abnormalities identified  Labs August 2021.  AST 74, ALT 47, alkaline phosphatase 194  Colonoscopy 2021 [facility]. Normal per patient.  EGD  6/1/2021.  Findings included a widely patent gastrojejunostomy, a solitary jejunal erosion with adherent blood just distal of the anastomosis, mild nonerosive corpus gastritis, and normal jejunal limb status post unremarkable random biopsies.  Gastric biopsies were negative for H. pylori.   Labs 4/14/2021:H/H 10.1/33, MCV 85.3, , WBC 10.5.  Ferritin 26.1.  Normal folate and vitamin B12.  AST 67, ALT 29, , T bili 0.7, CMP otherwise unremarkable aside from glucose 124.  She has a complicated medical history, and unfortunately is a fairly poor historian. Per a consult note from Dr. Bess in 2009, she was hospitalized at [facility] for abdominal pain related to acute, recurrent pancreatitis suspected to be related to familial hyperlipidemia. Her hospital course was complicated by respiratory distress syndrome requiring mechanical ventilation; prior ERCP findings raised concern for possible pancreas divisum. Due to the need for possible surgical intervention, she was transferred to [facility].   Per the patient, she was hospitalized at [facility] for 7 months, undergoing numerous surgeries. Initially, the head of the pancreas was removed, but symptoms persisted. This led to a splenectomy, and ultimately a total pancreatectomy. 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 Her initial surgeries were performed by Dr. Martin at [facility]; she has since followed with Dr. Buckner and undergone multiple hernia repairs.

## 2024-06-11 NOTE — HPI-TODAY'S VISIT:
Labs 6/7/2024:AST 86, ALT 42, , T. bili 2.9, albumin 3.1, total protein 6.8, CMP otherwise unremarkable aside from glucose 205.  BUN/creatinine 6/0.61.  Iron 78, TIBC 232, iron sat 34.  Ferritin 39.  Normal vitamin B12 and folate. CT of the abdomen and pelvis with contrast 5/21/2024:Constipation with prominent loops of small bowel and colon, cirrhotic hepatic morphology with multiple suspicious hepatic lesions, small volume of abdominopelvic ascites.

## 2024-06-11 NOTE — HPI-TODAY'S VISIT:
53-year-old female with an extensive medical history to include febrile pancreatitis with SIRS and questionable pancreas divisum s/p total pancreatectomy, splenectomy, and cholecystectomy (2009, Dr. Nkio Martin at [facility] cirrhosis related to metabolic associated steatosis, multiple abdominal hernia repairs, presenting for urgent evaluation of abdominal distention and swelling. This is a next day appointment.  She was seen in the office in May for follow-up regarding cirrhosis and gastroparesis. Her symptoms were overall stable at that time. She was referred to hematology for consideration of IV iron due to previous inability to tolerate oral iron. She was to continue lactulose and Xifaxan for HE.  The patient and her daughter have reached out to our office on multiple occasions since this visit due to ongong difficulty with constipation. Her enemas were stopped, in favor of increased lactulose dose. She presents today with complaint of abdominal and lower extremity swelling, which have been progressively worsening over the last two weeks. This has resulted in increasing shortness of breath. She has significant lower extremity swelling. She is only on spironolactone 25mg bid. Her symptoms seem to be worsening since her most recent round of IV fluids, which she does to stay hydrated to manage her constipation. This was initially ordered by Dr. Castro, then renewed by our office. She is having 3-4 bowel movements per day with lactulose and MOM three times daily. No blood in the stool. She did start amlodipine around the time of onset of swelling, but has since stopped the medication without relief. She has supplemental oxygen at home to use as needed. She is scheduled for a MRI tomorrow at [place] for follow-up of a liver lesion.

## 2024-06-24 ENCOUNTER — OFFICE VISIT (OUTPATIENT)
Dept: URBAN - METROPOLITAN AREA CLINIC 113 | Facility: CLINIC | Age: 54
End: 2024-06-24

## 2024-07-10 ENCOUNTER — OFFICE VISIT (OUTPATIENT)
Dept: URBAN - METROPOLITAN AREA CLINIC 107 | Facility: CLINIC | Age: 54
End: 2024-07-10
Payer: MEDICARE

## 2024-07-10 ENCOUNTER — LAB OUTSIDE AN ENCOUNTER (OUTPATIENT)
Dept: URBAN - METROPOLITAN AREA CLINIC 107 | Facility: CLINIC | Age: 54
End: 2024-07-10

## 2024-07-10 VITALS
WEIGHT: 140.2 LBS | SYSTOLIC BLOOD PRESSURE: 157 MMHG | BODY MASS INDEX: 25.8 KG/M2 | TEMPERATURE: 97.7 F | HEART RATE: 98 BPM | DIASTOLIC BLOOD PRESSURE: 83 MMHG | HEIGHT: 62 IN

## 2024-07-10 DIAGNOSIS — K74.60 CIRRHOSIS: ICD-10-CM

## 2024-07-10 DIAGNOSIS — R11.2 NAUSEA AND VOMITING: ICD-10-CM

## 2024-07-10 DIAGNOSIS — K21.9 GERD: ICD-10-CM

## 2024-07-10 DIAGNOSIS — R18.8 OTHER ASCITES: ICD-10-CM

## 2024-07-10 PROBLEM — 54822007: Status: ACTIVE | Noted: 2024-07-10

## 2024-07-10 PROCEDURE — 99214 OFFICE O/P EST MOD 30 MIN: CPT | Performed by: NURSE PRACTITIONER

## 2024-07-10 RX ORDER — ONDANSETRON 8 MG/1
1 TABLET ON THE TONGUE AND ALLOW TO DISSOLVE TABLET, ORALLY DISINTEGRATING ORAL
OUTPATIENT
Start: 2024-05-07

## 2024-07-10 RX ORDER — HYOSCYAMINE SULFATE 0.12 MG/1
1-2 TABLETS UNDER THE TONGUE AND ALLOW TO DISSOLVE TABLET, ORALLY DISINTEGRATING ORAL
Qty: 60 | Refills: 5 | Status: ACTIVE | COMMUNITY
Start: 2024-06-07 | End: 2024-12-03

## 2024-07-10 RX ORDER — PANTOPRAZOLE 40 MG/1
TAKE ONE TABLET BY MOUTH TWO (2) TIMES A DAY TABLET, DELAYED RELEASE ORAL
OUTPATIENT

## 2024-07-10 RX ORDER — SPIRONOLACTONE 25 MG/1
3 TABLETS TABLET, FILM COATED ORAL ONCE A DAY
OUTPATIENT
Start: 2024-06-11

## 2024-07-10 RX ORDER — LACTULOSE 10 G/15ML
60 ML SOLUTION ORAL
OUTPATIENT
Start: 2023-05-04

## 2024-07-10 RX ORDER — DESVENLAFAXINE 100 MG/1
1 TABLET TABLET, EXTENDED RELEASE ORAL ONCE A DAY
Status: ACTIVE | COMMUNITY

## 2024-07-10 RX ORDER — RIFAXIMIN 550 MG/1
1 TABLET TABLET ORAL TWICE A DAY
OUTPATIENT

## 2024-07-10 RX ORDER — FAMOTIDINE 40 MG/1
TAKE 1 TABLET BY MOUTH ONCE DAILY AT BEDTIME TABLET, FILM COATED ORAL ONCE A DAY
OUTPATIENT

## 2024-07-10 RX ORDER — LACTULOSE 10 G/15ML
60 ML SOLUTION ORAL TWICE DAILY
Qty: 3600 MILLILITER | Refills: 11 | Status: ACTIVE | COMMUNITY
Start: 2024-05-07 | End: 2025-05-02

## 2024-07-10 RX ORDER — LINACLOTIDE 290 UG/1
1 CAPSULE AT LEAST 30 MINUTES BEFORE THE FIRST MEAL OF THE DAY ON AN EMPTY STOMACH CAPSULE, GELATIN COATED ORAL ONCE A DAY
Qty: 90 | Refills: 3 | Status: ACTIVE | COMMUNITY
End: 2024-11-28

## 2024-07-10 RX ORDER — ONDANSETRON 8 MG/1
DISSOLVE 1 TABLET ON THE TONGUE TABLET, ORALLY DISINTEGRATING ORAL
Qty: 90 | Refills: 3 | Status: ACTIVE | COMMUNITY

## 2024-07-10 RX ORDER — PROMETHAZINE HYDROCHLORIDE 12.5 MG/1
1 TABLET AS NEEDED TABLET ORAL
OUTPATIENT

## 2024-07-10 RX ORDER — ONDANSETRON 8 MG/1
1 TABLET ON THE TONGUE AND ALLOW TO DISSOLVE TABLET, ORALLY DISINTEGRATING ORAL
Qty: 60 | Refills: 11 | Status: ACTIVE | COMMUNITY
Start: 2024-05-07

## 2024-07-10 RX ORDER — SPIRONOLACTONE 25 MG/1
3 TABLETS TABLET, FILM COATED ORAL ONCE A DAY
Qty: 90 TABLET | Refills: 2 | Status: ACTIVE | COMMUNITY
Start: 2024-06-11 | End: 2024-09-09

## 2024-07-10 RX ORDER — TELMISARTAN 80 MG/1
1 TABLET TABLET ORAL ONCE A DAY
Status: ACTIVE | COMMUNITY

## 2024-07-10 RX ORDER — ROSUVASTATIN 5 MG/1
1 TABLET TABLET, FILM COATED ORAL ONCE A DAY
Status: ACTIVE | COMMUNITY

## 2024-07-10 RX ORDER — FAMOTIDINE 40 MG/1
TAKE 1 TABLET BY MOUTH ONCE DAILY AT BEDTIME TABLET, FILM COATED ORAL ONCE A DAY
Qty: 90 | Refills: 3 | Status: ACTIVE | COMMUNITY

## 2024-07-10 RX ORDER — DICYCLOMINE HYDROCHLORIDE 20 MG/1
1 TABLET TABLET ORAL
Qty: 90 | Refills: 3 | Status: ACTIVE | COMMUNITY
End: 2024-11-28

## 2024-07-10 RX ORDER — HYDROCHLOROTHIAZIDE 25 MG/1
1 TABLET IN THE MORNING TABLET ORAL ONCE A DAY
Status: ACTIVE | COMMUNITY

## 2024-07-10 RX ORDER — LACTULOSE 10 G/15ML
60 ML SOLUTION ORAL
Qty: 1620 ML | Refills: 5 | Status: ACTIVE | COMMUNITY
Start: 2023-05-04 | End: 2025-05-27

## 2024-07-10 RX ORDER — PROMETHAZINE HYDROCHLORIDE 12.5 MG/1
1 TABLET AS NEEDED TABLET ORAL
Qty: 135 | Refills: 5 | Status: ACTIVE | COMMUNITY

## 2024-07-10 RX ORDER — PANTOPRAZOLE 40 MG/1
TAKE ONE TABLET BY MOUTH TWO (2) TIMES A DAY TABLET, DELAYED RELEASE ORAL
Qty: 180 TABLET | Refills: 3 | Status: ACTIVE | COMMUNITY

## 2024-07-10 RX ORDER — INSULIN DEGLUDEC INJECTION 200 U/ML
AS DIRECTED INJECTION, SOLUTION SUBCUTANEOUS
Status: ACTIVE | COMMUNITY

## 2024-07-10 RX ORDER — VIBEGRON 75 MG/1
1 TABLET TABLET, FILM COATED ORAL ONCE A DAY
Status: ACTIVE | COMMUNITY

## 2024-07-10 RX ORDER — RIFAXIMIN 550 MG/1
1 TABLET TABLET ORAL TWICE A DAY
Qty: 180 | Refills: 3 | Status: ACTIVE | COMMUNITY

## 2024-07-10 RX ORDER — FUROSEMIDE 40 MG/1
1 TABLET TABLET ORAL ONCE A DAY
Qty: 30 TABLET | Refills: 2 | Status: ACTIVE | COMMUNITY
Start: 2024-06-11 | End: 2024-09-09

## 2024-07-10 RX ORDER — PANCRELIPASE 36000; 180000; 114000 [USP'U]/1; [USP'U]/1; [USP'U]/1
TAKE 3 CAPSULES BY MOUTH 3 TIMES DAILY WITH MEALS AND 1 CAPSULE WITH SNACKS, MAX 11 CAPSULES PER DAY CAPSULE, DELAYED RELEASE PELLETS ORAL
Qty: 1100 CAPSULE | Refills: 2 | Status: ACTIVE | COMMUNITY

## 2024-07-10 RX ORDER — TRAZODONE HYDROCHLORIDE 150 MG/1
1 TABLET AT BEDTIME TABLET ORAL ONCE A DAY
Status: ON HOLD | COMMUNITY

## 2024-07-10 RX ORDER — LACTULOSE 10 G/15ML
60 ML SOLUTION ORAL TWICE DAILY
OUTPATIENT
Start: 2024-05-07

## 2024-07-10 RX ORDER — FUROSEMIDE 40 MG/1
1 TABLET TABLET ORAL ONCE A DAY
OUTPATIENT
Start: 2024-06-11

## 2024-07-10 RX ORDER — FENOFIBRATE 200 MG/1
1 CAPSULE WITH A MEAL CAPSULE ORAL ONCE A DAY
Status: ACTIVE | COMMUNITY

## 2024-07-10 NOTE — HPI-OTHER HISTORIES
Labs 6/7/2024:AST 86, ALT 42, , T. bili 2.9, albumin 3.1, total protein 6.8, CMP otherwise unremarkable aside from glucose 205. BUN/creatinine 6/0.61. Iron 78, TIBC 232, iron sat 34. Ferritin 39. Normal vitamin B12 and folate.  CT of the abdomen and pelvis with contrast 5/21/2024:Constipation with prominent loops of small bowel and colon, cirrhotic hepatic morphology with multiple suspicious hepatic lesions, small volume of abdominopelvic ascites.  CT abdomen and pelvis scheduled for 5/22/2024.  Labs. April 26, 2024. Hemoglobin 9.0. MCV 85. Platelet count 179,000. AST 54, ALT 25. Ferritin 12. Iron saturation 34%.  March 2024. Labs. Iron saturation 5%. Ferritin 14. Hemoglobin 8.5. AST 3017, ALT 16  She saw Dr. Link. He recommended that she not have surgery given her situation. He felt the risk was extremely high. He agreed with the previous decision to hold off on surgery in December 2022.  She has a history of bladder distention. Likely a neurogenic bladder. Associated with diabetes. She is followed in Dr. Cuello's office.  Labs 2/27/2024. CMP: Glucose 128, creatinine 0.6, sodium 140, total bilirubin 0.7, alk phos 141, AST 54. CBC: Hgb 9.1, HCT 29.5, RBC 3.37, MCV 87 and platelet normal at 174.  Colonoscopy September 2023. Moderately redundant colon notable for looping. Normal colonic mucosa. Moderate external hemorrhoids with some degree of prolapse. Random biopsies were notable for normal colonic mucosa. No evidence for microscopic colitis.  September 2023. Alpha-fetoprotein 4.9  Labs August 2023. AST 56, ALT 27. Alkaline phosphatase 175. Total bilirubin 1.0. INR 1.4, hemoglobin 9.5. Platelet count 155,000.  CT scan abdomen pelvis with IV contrast.  March 2023. Moderate stool throughout the colon.  Small ventral hernia.  Cirrhosis.  Small amount of ascites.  Multiple splenules.  No acute process identified.  Colonoscopy February 2023.  Markedly redundant colon notable for considerable looping.  2 mm ascending colonic erosion.  A sending descending and sigmoid colon was biopsied.  Preparation was fair.  Biopsies were notable for no significant abnormalities.  Normal.  Labs January 2023.  Hemoglobin 10.3.  MCV 90.  Platelet count 203K.  INR 1.5.  Creatinine 0.7.  AST 57, ALT 29.  Alkaline phosphatase 221.  Total bilirubin 0.8.  CT scan abdomen pelvis January 2023.  Hepatomegaly noted.  Postsurgical changes of the bowel identified.  Urinary bladder was markedly distended.  Right-sided nonobstructing kidney stones identified.  Evidence of previous cholecystectomy pancreatectomy and splenectomy noted.  Gastrografin enema.  December 2022.  No evidence for bowel obstruction or volvulus.  Colonoscopy December 2022.  Dr. Villagran.  Poor prep.  Discontinuous areas of ulcerative mucosa in the sigmoid colon.  The lumen of the sigmoid colon was significantly dilated.  KUB December 2022.  Dilated loops of colon within the mid and upper abdomen suggesting possible obstruction versus ileus.  CT abdomen pelvis December 14, 2022.  Obstruction within the distal sigmoid colon with proximal dilation of the colon and majority of the small bowel.  Decompressed and thickened distal sigmoid colon and upper rectum.  Ultrasound November 2022.  Surgically absent gallbladder.  4 mm common bile duct.  Spleen is surgically absent.  Splenosis noted.  Cirrhotic liver.  No mass was noted.  Abdominal ultrasound November 2022.  Negative for significant ascites.  Consistent with cirrhosis.  MRI of the abdomen with and without contrast 7/24/2022 showed cirrhosis with a small amount of ascites and intrahepatic lesions consistent with splenosis.  Abdominal ultrasound 7/22/2022 showed cirrhotic hepatic morphology with a 1.7 cm hypoechoic mass in the left hepatic lobe  CT of the abdomen and pelvis without contrast 7/21/2022 showed multiple stable hyperattenuating lesions along the liver capsule, largest measuring 1.9 cm, consistent with spondylosis.  Scant perihepatic ascites.  Atrophic pancreas with postsurgical changes/clips.  Status post cholecystectomy and splenectomy with multiple splenules noted.  Postsurgical changes of the stomach and small bowel consistent with gastric bypass.  Unchanged stranding within the mesentery and retroperitoneum.  No acute process.  Labs 7/1/2022:H/H11.5/34.9, MCV 98.6, , WBC 8.6.  AST 74, ALT 27, , T bili 1.4, albumin 4, total protein 8.2, CMP otherwise unremarkable aside from glucose 153.  Negative hepatitis A antibody IgM, hepatitis B core antibody IgM, hepatitis B surface antigen, hepatitis C antibody.  Normal vitamin B12 and folate.  Labs September 2021.  B12 788, CRP 5, anti-smooth muscle antibody negative, antimitochondrial antibody negative, vitamin D normal at 34, antigliadin antibody negative, antitissue transglutaminase antibody negative, glucose 426, alkaline phosphatase 219, AST 91, ALT 51, hepatitis serology negative, ferritin 504, hemoglobin 13  CT a/p wo contrast September 2021.  Prior splenectomy.  Splenosis was noted.  Gastric bypass status.  Nonobstructing kidney stone.  No acute abnormalities identified  Labs August 2021.  AST 74, ALT 47, alkaline phosphatase 194  Colonoscopy 2021 [facility]. Normal per patient.  EGD  6/1/2021.  Findings included a widely patent gastrojejunostomy, a solitary jejunal erosion with adherent blood just distal of the anastomosis, mild nonerosive corpus gastritis, and normal jejunal limb status post unremarkable random biopsies.  Gastric biopsies were negative for H. pylori.   Labs 4/14/2021:H/H 10.1/33, MCV 85.3, , WBC 10.5.  Ferritin 26.1.  Normal folate and vitamin B12.  AST 67, ALT 29, , T bili 0.7, CMP otherwise unremarkable aside from glucose 124.  She has a complicated medical history, and unfortunately is a fairly poor historian. Per a consult note from Dr. Bess in 2009, she was hospitalized at [facility] for abdominal pain related to acute, recurrent pancreatitis suspected to be related to familial hyperlipidemia. Her hospital course was complicated by respiratory distress syndrome requiring mechanical ventilation; prior ERCP findings raised concern for possible pancreas divisum. Due to the need for possible surgical intervention, she was transferred to [facility] [place].   Per the patient, she was hospitalized at [facility] for 7 months, undergoing numerous surgeries. Initially, the head of the pancreas was removed, but symptoms persisted. This led to a splenectomy, and ultimately a total pancreatectomy. 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 Her initial surgeries were performed by Dr. Martin at [facility] [place]; she has since followed with Dr. Buckner and undergone multiple hernia repairs.

## 2024-07-10 NOTE — HPI-TODAY'S VISIT:
53-year-old female with an extensive medical history to include febrile pancreatitis with SIRS and questionable pancreas divisum s/p total pancreatectomy, splenectomy, and cholecystectomy [place]2009, [place] Nik[place] [facility] [place] cirrhosis related to metabolic associated steatosis, multiple abdominal hernia repairs, presenting for follow-up. She was seen in the office in June for follow-up regarding cirrhosis secondary to NAFLD with significant volume overload secondary to decompensated liver disease and frequent IV fluids. Her diuretics were adjusted (furosemide 40mg daily and spironolactone 75mg daily) and STAT paracentesis was arranaged. She was encouraged a low-sodium diet and recommended to discontinue routine IV fluid replacement. A MRI was pending for follow-up of liver lesions noted on CT. She was to continue lactulose and Xifaxan for hepatic encephalopathy. MRI abdomen w/wo contrast 6/12/24: cirrhosis without intrahepatic mass, gastroesophageal varices, moderate abdominal pelvic ascites, prior splenectomy, pancreatectomy, cholecystectomy and distal gastrectomy. US guided paracentesis 6/12/24: removal of 270cc clear yellow fluid. Fluid culture and gram stain negative. SAAG c/w portal hypertension. Fortunately, her volume status has significantly improved following an adjustment in diuretic therapy coupled with therapeutic paracentesis. She has occasional bouts of lower extremity swelling, which is typically self-limited resolving in a few days. Within the last 2.5 weeks, she has experienced an exacerbation of epigastric pain with daily episodes of nausea and vomiting. Ondansetron and promethazine provide little relief. She denies any reflux symptoms on twice daily pantoprazole and famotidine prior to bedtime. She is having multiple loose stools per day with lactulose 60mg tid. Unfortunately, she has to remain at higher doses to manage hepatic encephalopathy and episodes of confusion. She is compliant with Xifaxan. She denies NSAID use. She has not received further IV fluids.

## 2024-07-12 ENCOUNTER — CLAIMS CREATED FROM THE CLAIM WINDOW (OUTPATIENT)
Dept: URBAN - METROPOLITAN AREA SURGERY CENTER 25 | Facility: SURGERY CENTER | Age: 54
End: 2024-07-12
Payer: MEDICARE

## 2024-07-12 ENCOUNTER — TELEPHONE ENCOUNTER (OUTPATIENT)
Dept: URBAN - METROPOLITAN AREA CLINIC 113 | Facility: CLINIC | Age: 54
End: 2024-07-12

## 2024-07-12 ENCOUNTER — LAB OUTSIDE AN ENCOUNTER (OUTPATIENT)
Dept: URBAN - METROPOLITAN AREA CLINIC 113 | Facility: CLINIC | Age: 54
End: 2024-07-12

## 2024-07-12 DIAGNOSIS — K76.6 PORTAL HYPERTENSION: ICD-10-CM

## 2024-07-12 DIAGNOSIS — Z98.0 INTESTINAL BYPASS AND ANASTOMOSIS STATUS: ICD-10-CM

## 2024-07-12 DIAGNOSIS — K31.89 OTHER DISEASES OF STOMACH AND DUODENUM: ICD-10-CM

## 2024-07-12 DIAGNOSIS — R10.13 EPIGASTRIC ABDOMINAL PAIN: ICD-10-CM

## 2024-07-12 DIAGNOSIS — R10.13 EPIGASTRIC PAIN: ICD-10-CM

## 2024-07-12 DIAGNOSIS — Z98.0 S/P BYPASS GASTROJEJUNOSTOMY: ICD-10-CM

## 2024-07-12 PROCEDURE — 43235 EGD DIAGNOSTIC BRUSH WASH: CPT | Performed by: INTERNAL MEDICINE

## 2024-07-12 PROCEDURE — 00731 ANES UPR GI NDSC PX NOS: CPT | Performed by: ANESTHESIOLOGY

## 2024-07-12 PROCEDURE — 00731 ANES UPR GI NDSC PX NOS: CPT | Performed by: ANESTHESIOLOGIST ASSISTANT

## 2024-07-12 RX ORDER — FENOFIBRATE 200 MG/1
1 CAPSULE WITH A MEAL CAPSULE ORAL ONCE A DAY
Status: ACTIVE | COMMUNITY

## 2024-07-12 RX ORDER — PROMETHAZINE HYDROCHLORIDE 12.5 MG/1
1 TABLET AS NEEDED TABLET ORAL
Status: ACTIVE | COMMUNITY

## 2024-07-12 RX ORDER — DESVENLAFAXINE 100 MG/1
1 TABLET TABLET, EXTENDED RELEASE ORAL ONCE A DAY
Status: ACTIVE | COMMUNITY

## 2024-07-12 RX ORDER — HYDROCHLOROTHIAZIDE 25 MG/1
1 TABLET IN THE MORNING TABLET ORAL ONCE A DAY
Status: ACTIVE | COMMUNITY

## 2024-07-12 RX ORDER — SPIRONOLACTONE 25 MG/1
3 TABLETS TABLET, FILM COATED ORAL ONCE A DAY
Status: ACTIVE | COMMUNITY
Start: 2024-06-11

## 2024-07-12 RX ORDER — FAMOTIDINE 40 MG/1
TAKE 1 TABLET BY MOUTH ONCE DAILY AT BEDTIME TABLET, FILM COATED ORAL ONCE A DAY
Status: ACTIVE | COMMUNITY

## 2024-07-12 RX ORDER — LACTULOSE 10 G/15ML
60 ML SOLUTION ORAL
Status: ACTIVE | COMMUNITY
Start: 2023-05-04

## 2024-07-12 RX ORDER — ROSUVASTATIN 5 MG/1
1 TABLET TABLET, FILM COATED ORAL ONCE A DAY
Status: ACTIVE | COMMUNITY

## 2024-07-12 RX ORDER — VIBEGRON 75 MG/1
1 TABLET TABLET, FILM COATED ORAL ONCE A DAY
Status: ACTIVE | COMMUNITY

## 2024-07-12 RX ORDER — DICYCLOMINE HYDROCHLORIDE 20 MG/1
1 TABLET TABLET ORAL
Qty: 90 | Refills: 3 | Status: ACTIVE | COMMUNITY
End: 2024-11-28

## 2024-07-12 RX ORDER — RIFAXIMIN 550 MG/1
1 TABLET TABLET ORAL TWICE A DAY
Status: ACTIVE | COMMUNITY

## 2024-07-12 RX ORDER — LINACLOTIDE 290 UG/1
1 CAPSULE AT LEAST 30 MINUTES BEFORE THE FIRST MEAL OF THE DAY ON AN EMPTY STOMACH CAPSULE, GELATIN COATED ORAL ONCE A DAY
Qty: 90 | Refills: 3 | Status: ACTIVE | COMMUNITY
End: 2024-11-28

## 2024-07-12 RX ORDER — TRAZODONE HYDROCHLORIDE 150 MG/1
1 TABLET AT BEDTIME TABLET ORAL ONCE A DAY
Status: ON HOLD | COMMUNITY

## 2024-07-12 RX ORDER — TELMISARTAN 80 MG/1
1 TABLET TABLET ORAL ONCE A DAY
Status: ACTIVE | COMMUNITY

## 2024-07-12 RX ORDER — HYOSCYAMINE SULFATE 0.12 MG/1
1-2 TABLETS UNDER THE TONGUE AND ALLOW TO DISSOLVE TABLET, ORALLY DISINTEGRATING ORAL
Qty: 60 | Refills: 5 | Status: ACTIVE | COMMUNITY
Start: 2024-06-07 | End: 2024-12-03

## 2024-07-12 RX ORDER — INSULIN DEGLUDEC INJECTION 200 U/ML
AS DIRECTED INJECTION, SOLUTION SUBCUTANEOUS
Status: ACTIVE | COMMUNITY

## 2024-07-12 RX ORDER — PANTOPRAZOLE 40 MG/1
TAKE ONE TABLET BY MOUTH TWO (2) TIMES A DAY TABLET, DELAYED RELEASE ORAL
Status: ACTIVE | COMMUNITY

## 2024-07-12 RX ORDER — ONDANSETRON 8 MG/1
1 TABLET ON THE TONGUE AND ALLOW TO DISSOLVE TABLET, ORALLY DISINTEGRATING ORAL
Status: ACTIVE | COMMUNITY
Start: 2024-05-07

## 2024-07-12 RX ORDER — LACTULOSE 10 G/15ML
60 ML SOLUTION ORAL TWICE DAILY
Status: ACTIVE | COMMUNITY
Start: 2024-05-07

## 2024-07-12 RX ORDER — PROCHLORPERAZINE MALEATE 5 MG/1
1 TABLET TABLET ORAL
Qty: 50 | Refills: 11 | OUTPATIENT
Start: 2024-07-12

## 2024-07-12 RX ORDER — PANCRELIPASE 36000; 180000; 114000 [USP'U]/1; [USP'U]/1; [USP'U]/1
TAKE 3 CAPSULES BY MOUTH 3 TIMES DAILY WITH MEALS AND 1 CAPSULE WITH SNACKS, MAX 11 CAPSULES PER DAY CAPSULE, DELAYED RELEASE PELLETS ORAL
Qty: 1100 CAPSULE | Refills: 2 | Status: ACTIVE | COMMUNITY

## 2024-07-12 RX ORDER — FUROSEMIDE 40 MG/1
1 TABLET TABLET ORAL ONCE A DAY
Status: ACTIVE | COMMUNITY
Start: 2024-06-11

## 2024-07-12 RX ORDER — ONDANSETRON 8 MG/1
DISSOLVE 1 TABLET ON THE TONGUE TABLET, ORALLY DISINTEGRATING ORAL
Qty: 90 | Refills: 3 | Status: ACTIVE | COMMUNITY

## 2024-07-15 ENCOUNTER — WEB ENCOUNTER (OUTPATIENT)
Dept: URBAN - METROPOLITAN AREA CLINIC 113 | Facility: CLINIC | Age: 54
End: 2024-07-15

## 2024-07-15 LAB
A/G RATIO: 0.7
ABSOLUTE BASOPHILS: 114
ABSOLUTE EOSINOPHILS: 160
ABSOLUTE LYMPHOCYTES: 1786
ABSOLUTE MONOCYTES: 859
ABSOLUTE NEUTROPHILS: 4682
AFP, SERUM, TUMOR MARKER: 10.3
ALBUMIN: 2.9
ALKALINE PHOSPHATASE: 187
ALT (SGPT): 40
AST (SGOT): 80
BASOPHILS: 1.5
BILIRUBIN, TOTAL: 1.7
BUN/CREATININE RATIO: 11
BUN: 5
CALCIUM: 8.7
CARBON DIOXIDE, TOTAL: 27
CHLORIDE: 104
CREATININE: 0.47
EGFR: 112
EOSINOPHILS: 2.1
GLOBULIN, TOTAL: 3.9
GLUCOSE: 174
HEMATOCRIT: 32.1
HEMOGLOBIN: 10.5
INR: 1.4
LYMPHOCYTES: 23.5
MCH: 32.6
MCHC: 32.7
MCV: 99.7
MONOCYTES: 11.3
MPV: 12.5
NEUTROPHILS: 61.6
PLATELET COUNT: 199
POTASSIUM: 5.3
PROTEIN, TOTAL: 6.8
PT: 14.6
RDW: 14.2
RED BLOOD CELL COUNT: 3.22
SODIUM: 137
WHITE BLOOD CELL COUNT: 7.6

## 2024-07-16 ENCOUNTER — LAB OUTSIDE AN ENCOUNTER (OUTPATIENT)
Dept: URBAN - METROPOLITAN AREA CLINIC 113 | Facility: CLINIC | Age: 54
End: 2024-07-16

## 2024-07-17 ENCOUNTER — WEB ENCOUNTER (OUTPATIENT)
Dept: URBAN - METROPOLITAN AREA CLINIC 113 | Facility: CLINIC | Age: 54
End: 2024-07-17

## 2024-07-19 ENCOUNTER — TELEPHONE ENCOUNTER (OUTPATIENT)
Dept: URBAN - METROPOLITAN AREA CLINIC 107 | Facility: CLINIC | Age: 54
End: 2024-07-19

## 2024-07-22 ENCOUNTER — WEB ENCOUNTER (OUTPATIENT)
Dept: URBAN - METROPOLITAN AREA CLINIC 113 | Facility: CLINIC | Age: 54
End: 2024-07-22

## 2024-07-26 ENCOUNTER — TELEPHONE ENCOUNTER (OUTPATIENT)
Dept: URBAN - METROPOLITAN AREA CLINIC 113 | Facility: CLINIC | Age: 54
End: 2024-07-26

## 2024-07-26 ENCOUNTER — TELEPHONE ENCOUNTER (OUTPATIENT)
Dept: URBAN - METROPOLITAN AREA CLINIC 107 | Facility: CLINIC | Age: 54
End: 2024-07-26

## 2024-07-27 ENCOUNTER — WEB ENCOUNTER (OUTPATIENT)
Dept: URBAN - METROPOLITAN AREA CLINIC 113 | Facility: CLINIC | Age: 54
End: 2024-07-27

## 2024-08-07 ENCOUNTER — LAB OUTSIDE AN ENCOUNTER (OUTPATIENT)
Dept: URBAN - METROPOLITAN AREA CLINIC 113 | Facility: CLINIC | Age: 54
End: 2024-08-07

## 2024-08-07 ENCOUNTER — WEB ENCOUNTER (OUTPATIENT)
Dept: URBAN - METROPOLITAN AREA CLINIC 113 | Facility: CLINIC | Age: 54
End: 2024-08-07

## 2024-08-07 RX ORDER — FUROSEMIDE 40 MG/1
1 TABLET TABLET ORAL TWICE DAILY
Qty: 180 | Refills: 3 | OUTPATIENT
Start: 2024-08-07 | End: 2025-08-02

## 2024-08-09 ENCOUNTER — WEB ENCOUNTER (OUTPATIENT)
Dept: URBAN - METROPOLITAN AREA CLINIC 113 | Facility: CLINIC | Age: 54
End: 2024-08-09

## 2024-08-22 ENCOUNTER — LAB OUTSIDE AN ENCOUNTER (OUTPATIENT)
Dept: URBAN - METROPOLITAN AREA CLINIC 113 | Facility: CLINIC | Age: 54
End: 2024-08-22

## 2024-08-22 ENCOUNTER — OFFICE VISIT (OUTPATIENT)
Dept: URBAN - METROPOLITAN AREA CLINIC 113 | Facility: CLINIC | Age: 54
End: 2024-08-22
Payer: MEDICARE

## 2024-08-22 VITALS
HEART RATE: 97 BPM | HEIGHT: 62 IN | RESPIRATION RATE: 18 BRPM | DIASTOLIC BLOOD PRESSURE: 75 MMHG | SYSTOLIC BLOOD PRESSURE: 137 MMHG | BODY MASS INDEX: 25.88 KG/M2 | WEIGHT: 140.6 LBS | TEMPERATURE: 98.8 F

## 2024-08-22 DIAGNOSIS — K21.9 GERD: ICD-10-CM

## 2024-08-22 DIAGNOSIS — K74.69 CIRRHOSIS, CRYPTOGENIC: ICD-10-CM

## 2024-08-22 DIAGNOSIS — R19.7 DIARRHEA: ICD-10-CM

## 2024-08-22 DIAGNOSIS — R11.2 NAUSEA AND VOMITING: ICD-10-CM

## 2024-08-22 PROCEDURE — 99214 OFFICE O/P EST MOD 30 MIN: CPT | Performed by: NURSE PRACTITIONER

## 2024-08-22 RX ORDER — PANTOPRAZOLE 40 MG/1
TAKE ONE TABLET BY MOUTH TWO (2) TIMES A DAY TABLET, DELAYED RELEASE ORAL
OUTPATIENT

## 2024-08-22 RX ORDER — LACTULOSE 10 G/15ML
60 ML SOLUTION ORAL
OUTPATIENT
Start: 2023-05-04

## 2024-08-22 RX ORDER — FENOFIBRATE 200 MG/1
1 CAPSULE WITH A MEAL CAPSULE ORAL ONCE A DAY
Status: ACTIVE | COMMUNITY

## 2024-08-22 RX ORDER — FUROSEMIDE 40 MG/1
1 TABLET TABLET ORAL ONCE A DAY
Status: ACTIVE | COMMUNITY
Start: 2024-06-11

## 2024-08-22 RX ORDER — INSULIN DEGLUDEC INJECTION 200 U/ML
AS DIRECTED INJECTION, SOLUTION SUBCUTANEOUS
Status: ACTIVE | COMMUNITY

## 2024-08-22 RX ORDER — PROCHLORPERAZINE MALEATE 5 MG/1
1 TABLET TABLET ORAL
Qty: 50 | Refills: 11 | Status: ACTIVE | COMMUNITY
Start: 2024-07-12

## 2024-08-22 RX ORDER — TRAZODONE HYDROCHLORIDE 150 MG/1
1 TABLET AT BEDTIME TABLET ORAL ONCE A DAY
Status: ON HOLD | COMMUNITY

## 2024-08-22 RX ORDER — FUROSEMIDE 40 MG/1
1 TABLET TABLET ORAL TWICE DAILY
Qty: 180 | Refills: 3 | Status: ACTIVE | COMMUNITY
Start: 2024-08-07 | End: 2025-08-02

## 2024-08-22 RX ORDER — PROMETHAZINE HYDROCHLORIDE 12.5 MG/1
1 SUPPOSITORY AS NEEDED FOR NAUSEA SUPPOSITORY RECTAL
Qty: 40 SUPPOSITORIES | Refills: 1 | OUTPATIENT
Start: 2024-08-22 | End: 2024-09-11

## 2024-08-22 RX ORDER — FUROSEMIDE 40 MG/1
1 TABLET TABLET ORAL ONCE A DAY
OUTPATIENT
Start: 2024-06-11

## 2024-08-22 RX ORDER — HYOSCYAMINE SULFATE 0.12 MG/1
1-2 TABLETS UNDER THE TONGUE AND ALLOW TO DISSOLVE TABLET, ORALLY DISINTEGRATING ORAL
Qty: 60 | Refills: 5 | Status: ACTIVE | COMMUNITY
Start: 2024-06-07 | End: 2024-12-03

## 2024-08-22 RX ORDER — PANCRELIPASE 36000; 180000; 114000 [USP'U]/1; [USP'U]/1; [USP'U]/1
TAKE 3 CAPSULES BY MOUTH 3 TIMES DAILY WITH MEALS AND 1 CAPSULE WITH SNACKS, MAX 11 CAPSULES PER DAY CAPSULE, DELAYED RELEASE PELLETS ORAL
Qty: 1100 CAPSULE | Refills: 2 | Status: ACTIVE | COMMUNITY

## 2024-08-22 RX ORDER — LACTULOSE 10 G/15ML
60 ML SOLUTION ORAL
Status: ACTIVE | COMMUNITY
Start: 2023-05-04

## 2024-08-22 RX ORDER — ONDANSETRON 8 MG/1
DISSOLVE 1 TABLET ON THE TONGUE TABLET, ORALLY DISINTEGRATING ORAL
Qty: 90 | Refills: 3 | Status: ACTIVE | COMMUNITY

## 2024-08-22 RX ORDER — LACTULOSE 10 G/15ML
60 ML SOLUTION ORAL TWICE DAILY
Status: ACTIVE | COMMUNITY
Start: 2024-05-07

## 2024-08-22 RX ORDER — ROSUVASTATIN 5 MG/1
1 TABLET TABLET, FILM COATED ORAL ONCE A DAY
Status: ACTIVE | COMMUNITY

## 2024-08-22 RX ORDER — SPIRONOLACTONE 25 MG/1
3 TABLETS TABLET, FILM COATED ORAL ONCE A DAY
OUTPATIENT
Start: 2024-06-11

## 2024-08-22 RX ORDER — VIBEGRON 75 MG/1
1 TABLET TABLET, FILM COATED ORAL ONCE A DAY
Status: ACTIVE | COMMUNITY

## 2024-08-22 RX ORDER — PROMETHAZINE HYDROCHLORIDE 12.5 MG/1
1 TABLET AS NEEDED TABLET ORAL
Status: ACTIVE | COMMUNITY

## 2024-08-22 RX ORDER — RIFAXIMIN 550 MG/1
1 TABLET TABLET ORAL TWICE A DAY
OUTPATIENT

## 2024-08-22 RX ORDER — SPIRONOLACTONE 25 MG/1
3 TABLETS TABLET, FILM COATED ORAL ONCE A DAY
Status: ACTIVE | COMMUNITY
Start: 2024-06-11

## 2024-08-22 RX ORDER — FAMOTIDINE 40 MG/1
TAKE 1 TABLET BY MOUTH ONCE DAILY AT BEDTIME TABLET, FILM COATED ORAL ONCE A DAY
Status: ACTIVE | COMMUNITY

## 2024-08-22 RX ORDER — FAMOTIDINE 40 MG/1
TAKE 1 TABLET BY MOUTH ONCE DAILY AT BEDTIME TABLET, FILM COATED ORAL ONCE A DAY
OUTPATIENT

## 2024-08-22 RX ORDER — TELMISARTAN 80 MG/1
1 TABLET TABLET ORAL ONCE A DAY
Status: ACTIVE | COMMUNITY

## 2024-08-22 RX ORDER — ONDANSETRON 8 MG/1
1 TABLET ON THE TONGUE AND ALLOW TO DISSOLVE TABLET, ORALLY DISINTEGRATING ORAL
Status: ACTIVE | COMMUNITY
Start: 2024-05-07

## 2024-08-22 RX ORDER — LINACLOTIDE 290 UG/1
1 CAPSULE AT LEAST 30 MINUTES BEFORE THE FIRST MEAL OF THE DAY ON AN EMPTY STOMACH CAPSULE, GELATIN COATED ORAL ONCE A DAY
Qty: 90 | Refills: 3 | Status: ACTIVE | COMMUNITY
End: 2024-11-28

## 2024-08-22 RX ORDER — DICYCLOMINE HYDROCHLORIDE 20 MG/1
1 TABLET TABLET ORAL
Qty: 90 | Refills: 3 | Status: ON HOLD | COMMUNITY
End: 2024-11-28

## 2024-08-22 RX ORDER — HYDROCHLOROTHIAZIDE 25 MG/1
1 TABLET IN THE MORNING TABLET ORAL ONCE A DAY
Status: ACTIVE | COMMUNITY

## 2024-08-22 RX ORDER — PANTOPRAZOLE 40 MG/1
TAKE ONE TABLET BY MOUTH TWO (2) TIMES A DAY TABLET, DELAYED RELEASE ORAL
Status: ACTIVE | COMMUNITY

## 2024-08-22 RX ORDER — RIFAXIMIN 550 MG/1
1 TABLET TABLET ORAL TWICE A DAY
Status: ACTIVE | COMMUNITY

## 2024-08-22 RX ORDER — DESVENLAFAXINE 100 MG/1
1 TABLET TABLET, EXTENDED RELEASE ORAL ONCE A DAY
Status: ACTIVE | COMMUNITY

## 2024-08-22 NOTE — HPI-TODAY'S VISIT:
53-year-old female with an extensive medical history to include febrile pancreatitis with SIRS and questionable pancreas divisum s/p total pancreatectomy, splenectomy, and cholecystectomy (2009, [place]. Niko [place] [facility] [place] cirrhosis related to metabolic associated steatosis, multiple abdominal hernia repairs, presenting for follow-up.  She was seen in the office on 7/10 for follow-up regarding decompensated cirrhosis secondary to NAFLD, complicated by portal hypertension with ascites, volume overload, and hepatic encephalopathy. Her volume status had stabilized with an adjustment in her diuretic regimen with furosemide and spironolactone. Labs were planned to assess kidney function in response to treatment. She was to continue a low-sodium diet and therapeutic paracentesis as required. There was no evidence for hepatic encephalopathy on her regimen with Xifaxan and lactulose, which she was encouraged to continue to titrate to effect. Recent MRI demonstrated cirrhosis without evidence for liver mass; repeat abdominal imaging was recommended in 6 months for HCC screening. Regarding epigastric pain and nausea with vomiting, an upper endoscopy was planned to evaluate for recurrent ulcer disease or gastric outlet obstruction. She was to continue her regimen with high dose acid suppressive therapy and antiemetics.

## 2024-08-22 NOTE — HPI-OTHER HISTORIES
MRI abdomen w/wo contrast 6/12/24: cirrhosis without intrahepatic mass, gastroesophageal varices, moderate abdominal pelvic ascites, prior splenectomy, pancreatectomy, cholecystectomy and distal gastrectomy.  US guided paracentesis 6/12/24: removal of 270cc clear yellow fluid. Fluid culture and gram stain negative. SAAG c/w portal hypertension.  Labs 6/7/2024:AST 86, ALT 42, , T. bili 2.9, albumin 3.1, total protein 6.8, CMP otherwise unremarkable aside from glucose 205. BUN/creatinine 6/0.61. Iron 78, TIBC 232, iron sat 34. Ferritin 39. Normal vitamin B12 and folate.  CT of the abdomen and pelvis with contrast 5/21/2024:Constipation with prominent loops of small bowel and colon, cirrhotic hepatic morphology with multiple suspicious hepatic lesions, small volume of abdominopelvic ascites.  CT abdomen and pelvis scheduled for 5/22/2024.  Labs. April 26, 2024. Hemoglobin 9.0. MCV 85. Platelet count 179,000. AST 54, ALT 25. Ferritin 12. Iron saturation 34%.  March 2024. Labs. Iron saturation 5%. Ferritin 14. Hemoglobin 8.5. AST 3017, ALT 16  She saw Dr. Link. He recommended that she not have surgery given her situation. He felt the risk was extremely high. He agreed with the previous decision to hold off on surgery in December 2022.  She has a history of bladder distention. Likely a neurogenic bladder. Associated with diabetes. She is followed in Dr. Cuello's office.  Labs 2/27/2024. CMP: Glucose 128, creatinine 0.6, sodium 140, total bilirubin 0.7, alk phos 141, AST 54. CBC: Hgb 9.1, HCT 29.5, RBC 3.37, MCV 87 and platelet normal at 174.  Colonoscopy September 2023. Moderately redundant colon notable for looping. Normal colonic mucosa. Moderate external hemorrhoids with some degree of prolapse. Random biopsies were notable for normal colonic mucosa. No evidence for microscopic colitis.  September 2023. Alpha-fetoprotein 4.9  Labs August 2023. AST 56, ALT 27. Alkaline phosphatase 175. Total bilirubin 1.0. INR 1.4, hemoglobin 9.5. Platelet count 155,000.  CT scan abdomen pelvis with IV contrast.  March 2023. Moderate stool throughout the colon.  Small ventral hernia.  Cirrhosis.  Small amount of ascites.  Multiple splenules.  No acute process identified.  Colonoscopy February 2023.  Markedly redundant colon notable for considerable looping.  2 mm ascending colonic erosion.  A sending descending and sigmoid colon was biopsied.  Preparation was fair.  Biopsies were notable for no significant abnormalities.  Normal.  Labs January 2023.  Hemoglobin 10.3.  MCV 90.  Platelet count 203K.  INR 1.5.  Creatinine 0.7.  AST 57, ALT 29.  Alkaline phosphatase 221.  Total bilirubin 0.8.  CT scan abdomen pelvis January 2023.  Hepatomegaly noted.  Postsurgical changes of the bowel identified.  Urinary bladder was markedly distended.  Right-sided nonobstructing kidney stones identified.  Evidence of previous cholecystectomy pancreatectomy and splenectomy noted.  Gastrografin enema.  December 2022.  No evidence for bowel obstruction or volvulus.  Colonoscopy December 2022.  Dr. Villagran.  Poor prep.  Discontinuous areas of ulcerative mucosa in the sigmoid colon.  The lumen of the sigmoid colon was significantly dilated.  KUB December 2022.  Dilated loops of colon within the mid and upper abdomen suggesting possible obstruction versus ileus.  CT abdomen pelvis December 14, 2022.  Obstruction within the distal sigmoid colon with proximal dilation of the colon and majority of the small bowel.  Decompressed and thickened distal sigmoid colon and upper rectum.  Ultrasound November 2022.  Surgically absent gallbladder.  4 mm common bile duct.  Spleen is surgically absent.  Splenosis noted.  Cirrhotic liver.  No mass was noted.  Abdominal ultrasound November 2022.  Negative for significant ascites.  Consistent with cirrhosis.  MRI of the abdomen with and without contrast 7/24/2022 showed cirrhosis with a small amount of ascites and intrahepatic lesions consistent with splenosis.  Abdominal ultrasound 7/22/2022 showed cirrhotic hepatic morphology with a 1.7 cm hypoechoic mass in the left hepatic lobe  CT of the abdomen and pelvis without contrast 7/21/2022 showed multiple stable hyperattenuating lesions along the liver capsule, largest measuring 1.9 cm, consistent with spondylosis.  Scant perihepatic ascites.  Atrophic pancreas with postsurgical changes/clips.  Status post cholecystectomy and splenectomy with multiple splenules noted.  Postsurgical changes of the stomach and small bowel consistent with gastric bypass.  Unchanged stranding within the mesentery and retroperitoneum.  No acute process.  Labs 7/1/2022:H/H11.5/34.9, MCV 98.6, , WBC 8.6.  AST 74, ALT 27, , T bili 1.4, albumin 4, total protein 8.2, CMP otherwise unremarkable aside from glucose 153.  Negative hepatitis A antibody IgM, hepatitis B core antibody IgM, hepatitis B surface antigen, hepatitis C antibody.  Normal vitamin B12 and folate.  Labs September 2021.  B12 788, CRP 5, anti-smooth muscle antibody negative, antimitochondrial antibody negative, vitamin D normal at 34, antigliadin antibody negative, antitissue transglutaminase antibody negative, glucose 426, alkaline phosphatase 219, AST 91, ALT 51, hepatitis serology negative, ferritin 504, hemoglobin 13  CT a/p wo contrast September 2021.  Prior splenectomy.  Splenosis was noted.  Gastric bypass status.  Nonobstructing kidney stone.  No acute abnormalities identified  Labs August 2021.  AST 74, ALT 47, alkaline phosphatase 194  Colonoscopy 2021 [facility]. Normal per patient.  EGD  6/1/2021.  Findings included a widely patent gastrojejunostomy, a solitary jejunal erosion with adherent blood just distal of the anastomosis, mild nonerosive corpus gastritis, and normal jejunal limb status post unremarkable random biopsies.  Gastric biopsies were negative for H. pylori.   Labs 4/14/2021:H/H 10.1/33, MCV 85.3, , WBC 10.5.  Ferritin 26.1.  Normal folate and vitamin B12.  AST 67, ALT 29, , T bili 0.7, CMP otherwise unremarkable aside from glucose 124.  She has a complicated medical history, and unfortunately is a fairly poor historian. Per a consult note from Dr. Bess in 2009, she was hospitalized at [facility] for abdominal pain related to acute, recurrent pancreatitis suspected to be related to familial hyperlipidemia. Her hospital course was complicated by respiratory distress syndrome requiring mechanical ventilation; prior ERCP findings raised concern for possible pancreas divisum. Due to the need for possible surgical intervention, [place] [facility] [place]   Per the patient, she was [place] [facility] [place], undergoing numerous surgeries. Initially, the head of the pancreas was removed, but symptoms persisted. This led to a splenectomy, and ultimately a total pancreatectomy. 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 Her initial surgeries were [place]. Martin [place] [facility]; she has since followed with Dr. Buckner and undergone multiple hernia repairs.

## 2024-08-22 NOTE — HPI-TODAY'S VISIT:
EGD 7/12/2024:3 mm submucosal fundic lesion, mild to moderate portal hypertensive gastropathy, status post gastrojejunostomy, normal afferent and efferent jejunal limbs.  She was recommended a trial of Compazine for refractory nausea and endoscopic ultrasound was recommended for evaluation of the submucosal lesion.  This is scheduled for 8/28. She was ultimately hospitalized in late July due to abdominal pain and ascites leak, following paracentesis at an outside hospital.  GI was not consulted.  Labs at that time showed H/H 9.4/28.5, MCV 96.9, , WBC 7.6.  AST 78, ALT 40, .98, T. bili 1.3, elevated glucose of 179, low sodium of 131, and borderline potassium 3.4.  Due to reaccumulation of fluid, she underwent ultrasound-guided paracentesis on 7/27 notable for the removal of 1.75 L of fluid.  Fluid pathology was negative for SBP.  Her home diuretic regimen was optimized by increasing spironolactone from 75 to 100 mg daily.  She was to continue furosemide 40 mg daily. Fortunately, she has not experienced further abdominal or lower extremity swelling.  She has not required a repeat paracentesis since the time of that hospitalization.  She is following a strict low-salt diet and is compliant with diuretic therapy.  She continues to experience daily exacerbations of nausea and vomiting.  She states she has no appetite, however, her weight is stable.  She complains of diarrhea with 5-6 loose stools per day.  She remains on Linzess, milk of magnesia, and high doses of lactulose.  She has had a couple of episodes of nighttime bowel movements.

## 2024-08-23 ENCOUNTER — TELEPHONE ENCOUNTER (OUTPATIENT)
Dept: URBAN - METROPOLITAN AREA CLINIC 113 | Facility: CLINIC | Age: 54
End: 2024-08-23

## 2024-08-23 LAB
A/G RATIO: 0.9
ABSOLUTE BASOPHILS: 80
ABSOLUTE EOSINOPHILS: 160
ABSOLUTE LYMPHOCYTES: 2480
ABSOLUTE MONOCYTES: 976
ABSOLUTE NEUTROPHILS: 4304
AFP, SERUM, TUMOR MARKER: 12.9
ALBUMIN: 3.1
ALKALINE PHOSPHATASE: 200
ALT (SGPT): 41
AST (SGOT): 72
BASOPHILS: 1
BILIRUBIN, TOTAL: 2.4
BUN/CREATININE RATIO: 6
BUN: 4
CALCIUM: 8.7
CARBON DIOXIDE, TOTAL: 26
CHLORIDE: 95
CREATININE: 0.71
EGFR: 100
EOSINOPHILS: 2
GLOBULIN, TOTAL: 3.6
GLUCOSE: 239
HEMATOCRIT: 32.4
HEMOGLOBIN: 10.8
INR: 1.4
LIPASE: 17
LYMPHOCYTES: 31
MCH: 32.2
MCHC: 33.3
MCV: 96.7
MONOCYTES: 12.2
MPV: 13
NEUTROPHILS: 53.8
PLATELET COUNT: 170
POTASSIUM: 5.1
PROTEIN, TOTAL: 6.7
PT: 14.6
RDW: 13.1
RED BLOOD CELL COUNT: 3.35
SODIUM: 128
WHITE BLOOD CELL COUNT: 8

## 2024-08-28 ENCOUNTER — OFFICE VISIT (OUTPATIENT)
Dept: URBAN - METROPOLITAN AREA MEDICAL CENTER 19 | Facility: MEDICAL CENTER | Age: 54
End: 2024-08-28

## 2024-08-28 RX ORDER — HYOSCYAMINE SULFATE 0.12 MG/1
1-2 TABLETS UNDER THE TONGUE AND ALLOW TO DISSOLVE TABLET, ORALLY DISINTEGRATING ORAL
Qty: 60 | Refills: 5 | Status: ACTIVE | COMMUNITY
Start: 2024-06-07 | End: 2024-12-03

## 2024-08-28 RX ORDER — DESVENLAFAXINE 100 MG/1
1 TABLET TABLET, EXTENDED RELEASE ORAL ONCE A DAY
Status: ACTIVE | COMMUNITY

## 2024-08-28 RX ORDER — PANTOPRAZOLE 40 MG/1
TAKE ONE TABLET BY MOUTH TWO (2) TIMES A DAY TABLET, DELAYED RELEASE ORAL
Status: ACTIVE | COMMUNITY

## 2024-08-28 RX ORDER — TRAZODONE HYDROCHLORIDE 150 MG/1
1 TABLET AT BEDTIME TABLET ORAL ONCE A DAY
Status: ON HOLD | COMMUNITY

## 2024-08-28 RX ORDER — DICYCLOMINE HYDROCHLORIDE 20 MG/1
1 TABLET TABLET ORAL
Qty: 90 | Refills: 3 | Status: ON HOLD | COMMUNITY
End: 2024-11-28

## 2024-08-28 RX ORDER — ONDANSETRON 8 MG/1
DISSOLVE 1 TABLET ON THE TONGUE TABLET, ORALLY DISINTEGRATING ORAL
Qty: 90 | Refills: 3 | Status: ACTIVE | COMMUNITY

## 2024-08-28 RX ORDER — ROSUVASTATIN 5 MG/1
1 TABLET TABLET, FILM COATED ORAL ONCE A DAY
Status: ACTIVE | COMMUNITY

## 2024-08-28 RX ORDER — PROMETHAZINE HYDROCHLORIDE 12.5 MG/1
1 SUPPOSITORY AS NEEDED FOR NAUSEA SUPPOSITORY RECTAL
Qty: 40 SUPPOSITORIES | Refills: 1 | Status: ACTIVE | COMMUNITY
Start: 2024-08-22 | End: 2024-09-11

## 2024-08-28 RX ORDER — ONDANSETRON 8 MG/1
1 TABLET ON THE TONGUE AND ALLOW TO DISSOLVE TABLET, ORALLY DISINTEGRATING ORAL
Status: ACTIVE | COMMUNITY
Start: 2024-05-07

## 2024-08-28 RX ORDER — FAMOTIDINE 40 MG/1
TAKE 1 TABLET BY MOUTH ONCE DAILY AT BEDTIME TABLET, FILM COATED ORAL ONCE A DAY
Status: ACTIVE | COMMUNITY

## 2024-08-28 RX ORDER — LACTULOSE 10 G/15ML
60 ML SOLUTION ORAL
Status: ACTIVE | COMMUNITY
Start: 2023-05-04

## 2024-08-28 RX ORDER — HYDROCHLOROTHIAZIDE 25 MG/1
1 TABLET IN THE MORNING TABLET ORAL ONCE A DAY
Status: ACTIVE | COMMUNITY

## 2024-08-28 RX ORDER — FUROSEMIDE 40 MG/1
1 TABLET TABLET ORAL ONCE A DAY
Status: ACTIVE | COMMUNITY
Start: 2024-06-11

## 2024-08-28 RX ORDER — RIFAXIMIN 550 MG/1
1 TABLET TABLET ORAL TWICE A DAY
Status: ACTIVE | COMMUNITY

## 2024-08-28 RX ORDER — LINACLOTIDE 290 UG/1
1 CAPSULE AT LEAST 30 MINUTES BEFORE THE FIRST MEAL OF THE DAY ON AN EMPTY STOMACH CAPSULE, GELATIN COATED ORAL ONCE A DAY
Qty: 90 | Refills: 3 | Status: ACTIVE | COMMUNITY
End: 2024-11-28

## 2024-08-28 RX ORDER — PROCHLORPERAZINE MALEATE 5 MG/1
1 TABLET TABLET ORAL
Qty: 50 | Refills: 11 | Status: ACTIVE | COMMUNITY
Start: 2024-07-12

## 2024-08-28 RX ORDER — FENOFIBRATE 200 MG/1
1 CAPSULE WITH A MEAL CAPSULE ORAL ONCE A DAY
Status: ACTIVE | COMMUNITY

## 2024-08-28 RX ORDER — PANCRELIPASE 36000; 180000; 114000 [USP'U]/1; [USP'U]/1; [USP'U]/1
TAKE 3 CAPSULES BY MOUTH 3 TIMES DAILY WITH MEALS AND 1 CAPSULE WITH SNACKS, MAX 11 CAPSULES PER DAY CAPSULE, DELAYED RELEASE PELLETS ORAL
Qty: 1100 CAPSULE | Refills: 2 | Status: ACTIVE | COMMUNITY

## 2024-08-28 RX ORDER — PROMETHAZINE HYDROCHLORIDE 12.5 MG/1
1 TABLET AS NEEDED TABLET ORAL
Status: ACTIVE | COMMUNITY

## 2024-08-28 RX ORDER — SPIRONOLACTONE 25 MG/1
3 TABLETS TABLET, FILM COATED ORAL ONCE A DAY
Status: ACTIVE | COMMUNITY
Start: 2024-06-11

## 2024-08-28 RX ORDER — TELMISARTAN 80 MG/1
1 TABLET TABLET ORAL ONCE A DAY
Status: ACTIVE | COMMUNITY

## 2024-08-28 RX ORDER — LACTULOSE 10 G/15ML
60 ML SOLUTION ORAL TWICE DAILY
Status: ACTIVE | COMMUNITY
Start: 2024-05-07

## 2024-08-28 RX ORDER — INSULIN DEGLUDEC INJECTION 200 U/ML
AS DIRECTED INJECTION, SOLUTION SUBCUTANEOUS
Status: ACTIVE | COMMUNITY

## 2024-08-28 RX ORDER — FUROSEMIDE 40 MG/1
1 TABLET TABLET ORAL TWICE DAILY
Qty: 180 | Refills: 3 | Status: ACTIVE | COMMUNITY
Start: 2024-08-07 | End: 2025-08-02

## 2024-08-28 RX ORDER — VIBEGRON 75 MG/1
1 TABLET TABLET, FILM COATED ORAL ONCE A DAY
Status: ACTIVE | COMMUNITY

## 2024-09-03 ENCOUNTER — TELEPHONE ENCOUNTER (OUTPATIENT)
Dept: URBAN - METROPOLITAN AREA CLINIC 113 | Facility: CLINIC | Age: 54
End: 2024-09-03

## 2024-09-03 ENCOUNTER — WEB ENCOUNTER (OUTPATIENT)
Dept: URBAN - METROPOLITAN AREA CLINIC 113 | Facility: CLINIC | Age: 54
End: 2024-09-03

## 2024-09-03 ENCOUNTER — LAB OUTSIDE AN ENCOUNTER (OUTPATIENT)
Dept: URBAN - METROPOLITAN AREA CLINIC 113 | Facility: CLINIC | Age: 54
End: 2024-09-03

## 2024-09-03 RX ORDER — DRONABINOL 5 MG/1
1 CAPSULE CAPSULE ORAL TWICE DAILY
Qty: 60 | Refills: 3 | OUTPATIENT
Start: 2024-09-03 | End: 2024-12-31

## 2024-09-03 RX ORDER — DRONABINOL 10 MG/1
1 CAPSULE CAPSULE ORAL TWICE DAILY
Qty: 60 | Refills: 3 | OUTPATIENT
Start: 2024-09-04 | End: 2025-01-01

## 2024-09-05 LAB
A/G RATIO: 0.9
ALBUMIN: 2.9
ALKALINE PHOSPHATASE: 174
ALT (SGPT): 43
AST (SGOT): 67
BILIRUBIN, TOTAL: 2
BUN/CREATININE RATIO: 10
BUN: 5
CALCIUM: 8.9
CARBON DIOXIDE, TOTAL: 27
CHLORIDE: 103
CORTISOL - AM: 10.2
CREATININE: 0.48
EGFR: 112
GLOBULIN, TOTAL: 3.4
GLUCOSE: 156
HEMATOCRIT: 32.5
HEMOGLOBIN: 10.8
MCH: 32.9
MCHC: 33.2
MCV: 99.1
MPV: 12
PLATELET COUNT: 158
POTASSIUM: 3.8
PROTEIN, TOTAL: 6.3
RDW: 13
RED BLOOD CELL COUNT: 3.28
SODIUM: 134
WHITE BLOOD CELL COUNT: 6.5

## 2024-09-09 ENCOUNTER — WEB ENCOUNTER (OUTPATIENT)
Dept: URBAN - METROPOLITAN AREA CLINIC 113 | Facility: CLINIC | Age: 54
End: 2024-09-09

## 2024-09-09 RX ORDER — DRONABINOL 10 MG/1
1 CAPSULE CAPSULE ORAL TWICE DAILY
Qty: 60 | Refills: 3
Start: 2024-09-04 | End: 2025-01-07

## 2024-09-10 ENCOUNTER — OFFICE VISIT (OUTPATIENT)
Dept: URBAN - METROPOLITAN AREA CLINIC 113 | Facility: CLINIC | Age: 54
End: 2024-09-10

## 2024-09-10 ENCOUNTER — WEB ENCOUNTER (OUTPATIENT)
Dept: URBAN - METROPOLITAN AREA CLINIC 113 | Facility: CLINIC | Age: 54
End: 2024-09-10

## 2024-09-10 ENCOUNTER — ERX REFILL RESPONSE (OUTPATIENT)
Dept: URBAN - METROPOLITAN AREA CLINIC 113 | Facility: CLINIC | Age: 54
End: 2024-09-10

## 2024-09-10 RX ORDER — LINACLOTIDE 290 UG/1
1 CAPSULE AT LEAST 30 MINUTES BEFORE THE FIRST MEAL OF THE DAY ON AN EMPTY STOMACH CAPSULE, GELATIN COATED ORAL ONCE A DAY
Qty: 90 | Refills: 3 | OUTPATIENT

## 2024-09-16 ENCOUNTER — WEB ENCOUNTER (OUTPATIENT)
Dept: URBAN - METROPOLITAN AREA CLINIC 113 | Facility: CLINIC | Age: 54
End: 2024-09-16

## 2024-09-19 ENCOUNTER — ERX REFILL RESPONSE (OUTPATIENT)
Dept: URBAN - METROPOLITAN AREA CLINIC 113 | Facility: CLINIC | Age: 54
End: 2024-09-19

## 2024-09-19 RX ORDER — FAMOTIDINE 40 MG/1
1 TABLET TABLET, FILM COATED ORAL
Qty: 90 | Refills: 3 | OUTPATIENT

## 2024-09-19 RX ORDER — FAMOTIDINE 40 MG/1
TAKE 1 TABLET BY MOUTH ONCE DAILY AT BEDTIME TABLET, FILM COATED ORAL ONCE A DAY
OUTPATIENT

## 2024-10-18 ENCOUNTER — TELEPHONE ENCOUNTER (OUTPATIENT)
Dept: URBAN - METROPOLITAN AREA CLINIC 113 | Facility: CLINIC | Age: 54
End: 2024-10-18

## 2024-10-24 ENCOUNTER — WEB ENCOUNTER (OUTPATIENT)
Dept: URBAN - METROPOLITAN AREA CLINIC 113 | Facility: CLINIC | Age: 54
End: 2024-10-24

## 2024-10-25 ENCOUNTER — ERX REFILL RESPONSE (OUTPATIENT)
Dept: URBAN - METROPOLITAN AREA CLINIC 113 | Facility: CLINIC | Age: 54
End: 2024-10-25

## 2024-10-25 RX ORDER — LACTULOSE 10 G/15ML
TAKE 60 ML BY MOUTH 3 TIMES  DAILY SOLUTION ORAL; RECTAL
Qty: 18447 MILLILITER | Refills: 3 | OUTPATIENT

## 2024-10-25 RX ORDER — LACTULOSE 10 G/15ML
TAKE 60 ML BY MOUTH 3 TIMES  DAILY SOLUTION ORAL; RECTAL
Qty: 18447 MILLILITER | Refills: 11 | OUTPATIENT

## 2024-10-28 ENCOUNTER — ERX REFILL RESPONSE (OUTPATIENT)
Dept: URBAN - METROPOLITAN AREA CLINIC 72 | Facility: CLINIC | Age: 54
End: 2024-10-28

## 2024-10-28 RX ORDER — RIFAXIMIN 550 MG/1
1 TABLET TABLET ORAL TWICE A DAY
OUTPATIENT

## 2024-10-28 RX ORDER — RIFAXIMIN 550 MG/1
TAKE 1 TABLET BY MOUTH TWICE  DAILY TABLET ORAL
Qty: 200 TABLET | Refills: 2 | OUTPATIENT

## 2024-11-01 ENCOUNTER — WEB ENCOUNTER (OUTPATIENT)
Dept: URBAN - METROPOLITAN AREA CLINIC 113 | Facility: CLINIC | Age: 54
End: 2024-11-01

## 2024-11-07 ENCOUNTER — TELEPHONE ENCOUNTER (OUTPATIENT)
Dept: URBAN - METROPOLITAN AREA CLINIC 113 | Facility: CLINIC | Age: 54
End: 2024-11-07

## 2024-11-11 ENCOUNTER — OFFICE VISIT (OUTPATIENT)
Dept: URBAN - METROPOLITAN AREA CLINIC 113 | Facility: CLINIC | Age: 54
End: 2024-11-11
Payer: COMMERCIAL

## 2024-11-11 ENCOUNTER — LAB OUTSIDE AN ENCOUNTER (OUTPATIENT)
Dept: URBAN - METROPOLITAN AREA CLINIC 113 | Facility: CLINIC | Age: 54
End: 2024-11-11

## 2024-11-11 VITALS
SYSTOLIC BLOOD PRESSURE: 119 MMHG | HEART RATE: 107 BPM | RESPIRATION RATE: 20 BRPM | HEIGHT: 62 IN | DIASTOLIC BLOOD PRESSURE: 60 MMHG | TEMPERATURE: 98.1 F | WEIGHT: 134.2 LBS | BODY MASS INDEX: 24.69 KG/M2

## 2024-11-11 DIAGNOSIS — R74.8 ELEVATED LIVER ENZYMES: ICD-10-CM

## 2024-11-11 DIAGNOSIS — K74.60 CIRRHOSIS: ICD-10-CM

## 2024-11-11 DIAGNOSIS — K21.9 GERD: ICD-10-CM

## 2024-11-11 DIAGNOSIS — R11.0 NAUSEA: ICD-10-CM

## 2024-11-11 DIAGNOSIS — R18.8 OTHER ASCITES: ICD-10-CM

## 2024-11-11 DIAGNOSIS — K59.01 CONSTIPATION: ICD-10-CM

## 2024-11-11 PROCEDURE — 99214 OFFICE O/P EST MOD 30 MIN: CPT | Performed by: NURSE PRACTITIONER

## 2024-11-11 RX ORDER — RIFAXIMIN 550 MG/1
TAKE 1 TABLET BY MOUTH TWICE  DAILY TABLET ORAL
Qty: 200 TABLET | Refills: 2 | Status: ACTIVE | COMMUNITY

## 2024-11-11 RX ORDER — FUROSEMIDE 40 MG/1
1 TABLET TABLET ORAL ONCE A DAY
Status: ON HOLD | COMMUNITY
Start: 2024-06-11

## 2024-11-11 RX ORDER — DESVENLAFAXINE 100 MG/1
1 TABLET TABLET, EXTENDED RELEASE ORAL ONCE A DAY
Status: ACTIVE | COMMUNITY

## 2024-11-11 RX ORDER — FAMOTIDINE 40 MG/1
1 TABLET TABLET, FILM COATED ORAL
Qty: 90 | Refills: 3 | Status: ACTIVE | COMMUNITY

## 2024-11-11 RX ORDER — DRONABINOL 5 MG/1
1 CAPSULE CAPSULE ORAL TWICE DAILY
Qty: 60 | Refills: 3 | Status: ON HOLD | COMMUNITY
Start: 2024-09-03 | End: 2024-12-31

## 2024-11-11 RX ORDER — LACTULOSE 10 G/15ML
60 ML SOLUTION ORAL TWICE DAILY
OUTPATIENT
Start: 2024-05-07

## 2024-11-11 RX ORDER — PROMETHAZINE HYDROCHLORIDE 12.5 MG/1
1 SUPPOSITORY AS NEEDED FOR NAUSEA SUPPOSITORY RECTAL
Qty: 40 SUPPOSITORIES | Refills: 1 | OUTPATIENT
Start: 2024-11-11 | End: 2024-12-01

## 2024-11-11 RX ORDER — FENOFIBRATE 200 MG/1
1 CAPSULE WITH A MEAL CAPSULE ORAL ONCE A DAY
Status: ACTIVE | COMMUNITY

## 2024-11-11 RX ORDER — DRONABINOL 10 MG/1
1 CAPSULE CAPSULE ORAL TWICE DAILY
Qty: 60 | Refills: 3 | Status: ON HOLD | COMMUNITY
Start: 2024-09-04 | End: 2025-01-07

## 2024-11-11 RX ORDER — LINACLOTIDE 290 UG/1
1 CAPSULE AT LEAST 30 MINUTES BEFORE THE FIRST MEAL OF THE DAY ON AN EMPTY STOMACH CAPSULE, GELATIN COATED ORAL ONCE A DAY
Qty: 90 | Refills: 3 | Status: ACTIVE | COMMUNITY

## 2024-11-11 RX ORDER — INSULIN DEGLUDEC INJECTION 200 U/ML
AS DIRECTED INJECTION, SOLUTION SUBCUTANEOUS
Status: ACTIVE | COMMUNITY

## 2024-11-11 RX ORDER — RIFAXIMIN 550 MG/1
TAKE 1 TABLET BY MOUTH TWICE  DAILY TABLET ORAL
OUTPATIENT

## 2024-11-11 RX ORDER — FUROSEMIDE 40 MG/1
1 TABLET TABLET ORAL TWICE DAILY
Qty: 180 | Refills: 3 | Status: ACTIVE | COMMUNITY
Start: 2024-08-07 | End: 2025-08-02

## 2024-11-11 RX ORDER — HYDROCHLOROTHIAZIDE 25 MG/1
1 TABLET IN THE MORNING TABLET ORAL ONCE A DAY
Status: ACTIVE | COMMUNITY

## 2024-11-11 RX ORDER — LACTULOSE 10 G/15ML
60 ML SOLUTION ORAL
Status: ON HOLD | COMMUNITY
Start: 2023-05-04

## 2024-11-11 RX ORDER — LACTULOSE 10 G/15ML
60 ML SOLUTION ORAL TWICE DAILY
Status: ACTIVE | COMMUNITY
Start: 2024-05-07

## 2024-11-11 RX ORDER — LACTULOSE 10 G/15ML
TAKE 60 ML BY MOUTH 3 TIMES  DAILY SOLUTION ORAL; RECTAL
Qty: 18447 MILLILITER | Refills: 11 | Status: ACTIVE | COMMUNITY

## 2024-11-11 RX ORDER — TELMISARTAN 80 MG/1
1 TABLET TABLET ORAL ONCE A DAY
Status: ACTIVE | COMMUNITY

## 2024-11-11 RX ORDER — PANTOPRAZOLE 40 MG/1
TAKE ONE TABLET BY MOUTH TWO (2) TIMES A DAY TABLET, DELAYED RELEASE ORAL
Status: ACTIVE | COMMUNITY

## 2024-11-11 RX ORDER — PANTOPRAZOLE 40 MG/1
TAKE ONE TABLET BY MOUTH TWO (2) TIMES A DAY TABLET, DELAYED RELEASE ORAL
OUTPATIENT

## 2024-11-11 RX ORDER — LINACLOTIDE 290 UG/1
1 CAPSULE AT LEAST 30 MINUTES BEFORE THE FIRST MEAL OF THE DAY ON AN EMPTY STOMACH CAPSULE, GELATIN COATED ORAL ONCE A DAY
OUTPATIENT

## 2024-11-11 RX ORDER — SPIRONOLACTONE 25 MG/1
3 TABLETS TABLET, FILM COATED ORAL ONCE A DAY
OUTPATIENT
Start: 2024-06-11

## 2024-11-11 RX ORDER — VIBEGRON 75 MG/1
1 TABLET TABLET, FILM COATED ORAL ONCE A DAY
Status: ACTIVE | COMMUNITY

## 2024-11-11 RX ORDER — HYOSCYAMINE SULFATE 0.12 MG/1
1-2 TABLETS UNDER THE TONGUE AND ALLOW TO DISSOLVE TABLET, ORALLY DISINTEGRATING ORAL
Qty: 60 | Refills: 5 | Status: ACTIVE | COMMUNITY
Start: 2024-06-07 | End: 2024-12-03

## 2024-11-11 RX ORDER — PROMETHAZINE HYDROCHLORIDE 12.5 MG/1
1 TABLET AS NEEDED TABLET ORAL
Status: ACTIVE | COMMUNITY

## 2024-11-11 RX ORDER — ONDANSETRON 8 MG/1
1 TABLET ON THE TONGUE AND ALLOW TO DISSOLVE TABLET, ORALLY DISINTEGRATING ORAL
Status: ACTIVE | COMMUNITY
Start: 2024-05-07

## 2024-11-11 RX ORDER — PANCRELIPASE 36000; 180000; 114000 [USP'U]/1; [USP'U]/1; [USP'U]/1
TAKE 3 CAPSULES BY MOUTH 3 TIMES DAILY WITH MEALS AND 1 CAPSULE WITH SNACKS, MAX 11 CAPSULES PER DAY CAPSULE, DELAYED RELEASE PELLETS ORAL
Qty: 1100 CAPSULE | Refills: 2 | Status: ACTIVE | COMMUNITY

## 2024-11-11 RX ORDER — SPIRONOLACTONE 25 MG/1
3 TABLETS TABLET, FILM COATED ORAL ONCE A DAY
Status: ACTIVE | COMMUNITY
Start: 2024-06-11

## 2024-11-11 RX ORDER — FUROSEMIDE 40 MG/1
1 TABLET TABLET ORAL TWICE DAILY
OUTPATIENT
Start: 2024-08-07

## 2024-11-11 RX ORDER — TRAZODONE HYDROCHLORIDE 150 MG/1
1 TABLET AT BEDTIME TABLET ORAL ONCE A DAY
Status: ON HOLD | COMMUNITY

## 2024-11-11 RX ORDER — ONDANSETRON 8 MG/1
DISSOLVE 1 TABLET ON THE TONGUE TABLET, ORALLY DISINTEGRATING ORAL
Qty: 90 | Refills: 3 | Status: ON HOLD | COMMUNITY

## 2024-11-11 RX ORDER — PROCHLORPERAZINE MALEATE 5 MG/1
1 TABLET TABLET ORAL
Qty: 50 | Refills: 11 | Status: ACTIVE | COMMUNITY
Start: 2024-07-12

## 2024-11-11 RX ORDER — ROSUVASTATIN 5 MG/1
1 TABLET TABLET, FILM COATED ORAL ONCE A DAY
Status: ACTIVE | COMMUNITY

## 2024-11-11 RX ORDER — DICYCLOMINE HYDROCHLORIDE 20 MG/1
1 TABLET TABLET ORAL
Qty: 90 | Refills: 3 | Status: ON HOLD | COMMUNITY
End: 2024-11-28

## 2024-11-11 NOTE — HPI-TODAY'S VISIT:
53-year-old female with an extensive medical history to include febrile pancreatitis with SIRS and questionable pancreas divisum s/p total pancreatectomy, splenectomy, and cholecystectomy [place]2009, [place] [facility] [place] cirrhosis related to metabolic associated steatosis, multiple abdominal hernia repairs, presenting for follow-up.  She was last seen in the office 8/22/24, summary of that visit and recommendations as follows- Decompensated cirrhosis secondary to NAFLD, complicated by portal hypertension with ascites, volume overload, and hepatic encephalopathy. Fortunately, her volume status has stabilized with her regimen with furosemide, spironolactone, and strict adherence to a no sodium diet. We will repeat therapeutic paracentesis as required (last about 4 weeks ago). There is no evidence for hepatic encephalopathy on her regimen with lactulose and Xifaxan. Recent EGD revealed mild to moderate portal hypertensive gastropathy, negative for esophageal varices. Recommend repeat AFP and abdominal imaging (US vs MRI) every 6 months for hepatoma screening.  Regarding ongoing symptoms of nausea and vomiting, a CT of the abdomen and pelvis will be performed to evaluate for acute abdominal pathology. Promethazine suppositories will be provided to use as needed given the inability to tolerate oral antiemetics. Recent EGD findings did not explain her vomiting. She will proceed with upper EUS as planned next week for evaluation of submucosal fundic lesion noted at the time of EGD.  Suspect her recent increase in diarrhea is likely secondary to her current bowel regimen with Linzesss, MOM, and high dose lactulose. She has been encouraged to reduce milk of magnesia and/or lactulose, with plan for stool studies if diarrhea persists. An order will be provided to complete closer to home.

## 2024-11-11 NOTE — HPI-TODAY'S VISIT:
Interval workup as follows- MRI of the brain with and without contrast on 9/11/2024 for evaluation of chronic intractable nausea was without acute finding.  Tiny foci of nonenhancing bifrontal subcortical white matter FLAIR hyperintensity which is nonspecific.  Opacification of the right sphenoid sinus with T1 hyperintense T2 hypointense components which can be seen with inspissated secretions or allergic fungal sinusitis. EUS 8/28/2024:Homogeneous 30 mm x 22 mm isoechoic intramural fundic lesion which favors splenosis. mild portal hypertensive gastropathy.  No esophageal varices noted endoscopically.  Collateral venous structures notable adjacent to the esophagus by endoscopic ultrasound.  Status post gastrojejunostomy. CT of the abdomen and pelvis with contrast 8/27/2024:Cirrhotic morphology of the liver with gastroesophageal varices.  Stable enhancing nodular lesions along the left upper liver capsule previously described as splenosis.  Decreasing trace perihepatic ascites and mesenteric edema.  Status post Whipple procedure with stable splenosis within the posterior splenectomy bed.   Constipation. Labs 9//24:AST 67, ALT 43, , T. bili 2.0, CMP otherwise unremarkable aside from glucose 156, sodium 134.  Normal a.m. cortisol.   H/H10.8/32.5, MCV 99.1, , WBC 6.5. 8/22/2024:AST 72, ALT 41, , T. bili 2.4, CMP otherwise unremarkable aside from glucose 239, sodium 128.  H/H10.8/32.4, MCV 96.7, , WBC 8.  aFP 12.9.  Lipase 17.  PT/INR 14.4.  She has been hospitalized twice at [facility] within the last several weeks due to altered mental status, hyponatremia, and lower extremity edema. Her furosemide and spironolactone were adjusted, but she is unsure of the current dosages. Overall, she is doing fairly well. She has not experienced further episodes of confusion. She is having 3-4 bowel movements per day with lactulose 60cc three times daily. She is compliant with Xifaxan. She experienced abdominal cramping and passing blood clots while in the hospital but this has resolved. She remains on Linzess but has stopped milk of magnesia. Her daughter accompanies her and assists with the history. Over 140 pages of records were reviewed. CT of the abdomen and pelvis without contrast 10/26/2024:Cirrhosis with small ascites.  Status post Whipple.  Diffuse small bowel and colon wall thickening suggestive of possible enterocolitis.

## 2024-11-11 NOTE — HPI-OTHER HISTORIES
She was hospitalized in late July 2024 due to abdominal pain and ascites leak, following paracentesis at an outside hospital. GI was not consulted. Labs at that time showed H/H 9.4/28.5, MCV 96.9, , WBC 7.6. AST 78, ALT 40, .98, T. bili 1.3, elevated glucose of 179, low sodium of 131, and borderline potassium 3.4. Due to reaccumulation of fluid, she underwent ultrasound-guided paracentesis on 7/27 notable for the removal of 1.75 L of fluid. Fluid pathology was negative for SBP. Her home diuretic regimen was optimized by increasing spironolactone from 75 to 100 mg daily. She was to continue furosemide 40 mg daily.  EGD 7/12/2024:3 mm submucosal fundic lesion, mild to moderate portal hypertensive gastropathy, status post gastrojejunostomy, normal afferent and efferent jejunal limbs. She was recommended a trial of Compazine for refractory nausea and endoscopic ultrasound was recommended for evaluation of the submucosal lesion.  MRI abdomen w/wo contrast 6/12/24: cirrhosis without intrahepatic mass, gastroesophageal varices, moderate abdominal pelvic ascites, prior splenectomy, pancreatectomy, cholecystectomy and distal gastrectomy.  US guided paracentesis 6/12/24: removal of 270cc clear yellow fluid. Fluid culture and gram stain negative. SAAG c/w portal hypertension.  Labs 6/7/2024:AST 86, ALT 42, , T. bili 2.9, albumin 3.1, total protein 6.8, CMP otherwise unremarkable aside from glucose 205. BUN/creatinine 6/0.61. Iron 78, TIBC 232, iron sat 34. Ferritin 39. Normal vitamin B12 and folate.  CT of the abdomen and pelvis with contrast 5/21/2024:Constipation with prominent loops of small bowel and colon, cirrhotic hepatic morphology with multiple suspicious hepatic lesions, small volume of abdominopelvic ascites.  CT abdomen and pelvis scheduled for 5/22/2024.  Labs. April 26, 2024. Hemoglobin 9.0. MCV 85. Platelet count 179,000. AST 54, ALT 25. Ferritin 12. Iron saturation 34%.  March 2024. Labs. Iron saturation 5%. Ferritin 14. Hemoglobin 8.5. AST 3017, ALT 16  She saw Dr. Link. He recommended that she not have surgery given her situation. He felt the risk was extremely high. He agreed with the previous decision to hold off on surgery in December 2022.  She has a history of bladder distention. Likely a neurogenic bladder. Associated with diabetes. She is followed in Dr. Cuello's office.  Labs 2/27/2024. CMP: Glucose 128, creatinine 0.6, sodium 140, total bilirubin 0.7, alk phos 141, AST 54. CBC: Hgb 9.1, HCT 29.5, RBC 3.37, MCV 87 and platelet normal at 174.  Colonoscopy September 2023. Moderately redundant colon notable for looping. Normal colonic mucosa. Moderate external hemorrhoids with some degree of prolapse. Random biopsies were notable for normal colonic mucosa. No evidence for microscopic colitis.  September 2023. Alpha-fetoprotein 4.9  Labs August 2023. AST 56, ALT 27. Alkaline phosphatase 175. Total bilirubin 1.0. INR 1.4, hemoglobin 9.5. Platelet count 155,000.  CT scan abdomen pelvis with IV contrast.  March 2023. Moderate stool throughout the colon.  Small ventral hernia.  Cirrhosis.  Small amount of ascites.  Multiple splenules.  No acute process identified.  Colonoscopy February 2023.  Markedly redundant colon notable for considerable looping.  2 mm ascending colonic erosion.  A sending descending and sigmoid colon was biopsied.  Preparation was fair.  Biopsies were notable for no significant abnormalities.  Normal.  Labs January 2023.  Hemoglobin 10.3.  MCV 90.  Platelet count 203K.  INR 1.5.  Creatinine 0.7.  AST 57, ALT 29.  Alkaline phosphatase 221.  Total bilirubin 0.8.  CT scan abdomen pelvis January 2023.  Hepatomegaly noted.  Postsurgical changes of the bowel identified.  Urinary bladder was markedly distended.  Right-sided nonobstructing kidney stones identified.  Evidence of previous cholecystectomy pancreatectomy and splenectomy noted.  Gastrografin enema.  December 2022.  No evidence for bowel obstruction or volvulus.  Colonoscopy December 2022.  Dr. Villagran.  Poor prep.  Discontinuous areas of ulcerative mucosa in the sigmoid colon.  The lumen of the sigmoid colon was significantly dilated.  KUB December 2022.  Dilated loops of colon within the mid and upper abdomen suggesting possible obstruction versus ileus.  CT abdomen pelvis December 14, 2022.  Obstruction within the distal sigmoid colon with proximal dilation of the colon and majority of the small bowel.  Decompressed and thickened distal sigmoid colon and upper rectum.  Ultrasound November 2022.  Surgically absent gallbladder.  4 mm common bile duct.  Spleen is surgically absent.  Splenosis noted.  Cirrhotic liver.  No mass was noted.  Abdominal ultrasound November 2022.  Negative for significant ascites.  Consistent with cirrhosis.  MRI of the abdomen with and without contrast 7/24/2022 showed cirrhosis with a small amount of ascites and intrahepatic lesions consistent with splenosis.  Abdominal ultrasound 7/22/2022 showed cirrhotic hepatic morphology with a 1.7 cm hypoechoic mass in the left hepatic lobe  CT of the abdomen and pelvis without contrast 7/21/2022 showed multiple stable hyperattenuating lesions along the liver capsule, largest measuring 1.9 cm, consistent with spondylosis.  Scant perihepatic ascites.  Atrophic pancreas with postsurgical changes/clips.  Status post cholecystectomy and splenectomy with multiple splenules noted.  Postsurgical changes of the stomach and small bowel consistent with gastric bypass.  Unchanged stranding within the mesentery and retroperitoneum.  No acute process.  Labs 7/1/2022:H/H11.5/34.9, MCV 98.6, , WBC 8.6.  AST 74, ALT 27, , T bili 1.4, albumin 4, total protein 8.2, CMP otherwise unremarkable aside from glucose 153.  Negative hepatitis A antibody IgM, hepatitis B core antibody IgM, hepatitis B surface antigen, hepatitis C antibody.  Normal vitamin B12 and folate.  Labs September 2021.  B12 788, CRP 5, anti-smooth muscle antibody negative, antimitochondrial antibody negative, vitamin D normal at 34, antigliadin antibody negative, antitissue transglutaminase antibody negative, glucose 426, alkaline phosphatase 219, AST 91, ALT 51, hepatitis serology negative, ferritin 504, hemoglobin 13  CT a/p wo contrast September 2021.  Prior splenectomy.  Splenosis was noted.  Gastric bypass status.  Nonobstructing kidney stone.  No acute abnormalities identified  Labs August 2021.  AST 74, ALT 47, alkaline phosphatase 194  Colonoscopy 2021 [facility]. Normal per patient.  EGD  6/1/2021.  Findings included a widely patent gastrojejunostomy, a solitary jejunal erosion with adherent blood just distal of the anastomosis, mild nonerosive corpus gastritis, and normal jejunal limb status post unremarkable random biopsies.  Gastric biopsies were negative for H. pylori.   Labs 4/14/2021:H/H 10.1/33, MCV 85.3, , WBC 10.5.  Ferritin 26.1.  Normal folate and vitamin B12.  AST 67, ALT 29, , T bili 0.7, CMP otherwise unremarkable aside from glucose 124.  She has a complicated medical history, and unfortunately is a fairly poor historian. Per a consult note from Dr. Bess in 2009, she was hospitalized at [facility] for abdominal pain related to acute, recurrent pancreatitis suspected to be related to familial hyperlipidemia. Her hospital course was complicated by respiratory distress syndrome requiring mechanical ventilation; prior ERCP findings raised concern for possible pancreas divisum. Due to the need for possible surgical intervention, she was transferred to [facility].   Per the patient, she was hospitalized at [facility] for 7 months, undergoing numerous surgeries. Initially, the head of the pancreas was removed, but symptoms persisted. This led to a splenectomy, and ultimately a total pancreatectomy. 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 Her initial surgeries were performed by Dr. Martin at [facility]; she has since followed with Dr. Buckner and undergone multiple hernia repairs.

## 2024-11-18 ENCOUNTER — TELEPHONE ENCOUNTER (OUTPATIENT)
Dept: URBAN - METROPOLITAN AREA CLINIC 113 | Facility: CLINIC | Age: 54
End: 2024-11-18

## 2024-11-22 ENCOUNTER — CLAIMS CREATED FROM THE CLAIM WINDOW (OUTPATIENT)
Dept: URBAN - METROPOLITAN AREA MEDICAL CENTER 43 | Facility: MEDICAL CENTER | Age: 54
End: 2024-11-22
Payer: COMMERCIAL

## 2024-11-22 DIAGNOSIS — K76.82 HEPATIC ENCEPHALOPATHY: ICD-10-CM

## 2024-11-22 DIAGNOSIS — K74.69 CIRRHOSIS, CRYPTOGENIC: ICD-10-CM

## 2024-11-22 DIAGNOSIS — J18.8 OTHER PNEUMONIA, UNSPECIFIED ORGANISM: ICD-10-CM

## 2024-11-22 DIAGNOSIS — K56.699 SIGMOID STRICTURE: ICD-10-CM

## 2024-11-22 PROCEDURE — 99222 1ST HOSP IP/OBS MODERATE 55: CPT | Performed by: INTERNAL MEDICINE

## 2024-11-23 ENCOUNTER — CLAIMS CREATED FROM THE CLAIM WINDOW (OUTPATIENT)
Dept: URBAN - METROPOLITAN AREA MEDICAL CENTER 43 | Facility: MEDICAL CENTER | Age: 54
End: 2024-11-23
Payer: COMMERCIAL

## 2024-11-23 DIAGNOSIS — K56.699 COLON STRICTURE: ICD-10-CM

## 2024-11-23 DIAGNOSIS — K74.69 CIRRHOSIS, CRYPTOGENIC: ICD-10-CM

## 2024-11-23 DIAGNOSIS — K76.82 HEPATIC ENCEPHALOPATHY: ICD-10-CM

## 2024-11-23 PROCEDURE — 99232 SBSQ HOSP IP/OBS MODERATE 35: CPT | Performed by: INTERNAL MEDICINE

## 2024-11-24 ENCOUNTER — CLAIMS CREATED FROM THE CLAIM WINDOW (OUTPATIENT)
Dept: URBAN - METROPOLITAN AREA MEDICAL CENTER 43 | Facility: MEDICAL CENTER | Age: 54
End: 2024-11-24
Payer: COMMERCIAL

## 2024-11-24 DIAGNOSIS — K76.82 HEPATIC ENCEPHALOPATHY: ICD-10-CM

## 2024-11-24 DIAGNOSIS — K74.69 CIRRHOSIS, CRYPTOGENIC: ICD-10-CM

## 2024-11-24 DIAGNOSIS — K59.09 OTHER CONSTIPATION: ICD-10-CM

## 2024-11-24 PROCEDURE — 99232 SBSQ HOSP IP/OBS MODERATE 35: CPT | Performed by: INTERNAL MEDICINE

## 2024-11-25 ENCOUNTER — CLAIMS CREATED FROM THE CLAIM WINDOW (OUTPATIENT)
Dept: URBAN - METROPOLITAN AREA MEDICAL CENTER 43 | Facility: MEDICAL CENTER | Age: 54
End: 2024-11-25
Payer: COMMERCIAL

## 2024-11-25 DIAGNOSIS — K76.82 ACUTE HEPATIC ENCEPHALOPATHY: ICD-10-CM

## 2024-11-25 DIAGNOSIS — K74.69 CIRRHOSIS, CRYPTOGENIC: ICD-10-CM

## 2024-11-25 DIAGNOSIS — K59.09 CHRONIC CONSTIPATION: ICD-10-CM

## 2024-11-25 PROCEDURE — 99232 SBSQ HOSP IP/OBS MODERATE 35: CPT | Performed by: INTERNAL MEDICINE

## 2024-11-26 ENCOUNTER — OFFICE VISIT (OUTPATIENT)
Dept: URBAN - METROPOLITAN AREA CLINIC 113 | Facility: CLINIC | Age: 54
End: 2024-11-26

## 2025-01-06 ENCOUNTER — OFFICE VISIT (OUTPATIENT)
Dept: URBAN - METROPOLITAN AREA CLINIC 113 | Facility: CLINIC | Age: 55
End: 2025-01-06